# Patient Record
Sex: FEMALE | Race: WHITE | Employment: OTHER | URBAN - METROPOLITAN AREA
[De-identification: names, ages, dates, MRNs, and addresses within clinical notes are randomized per-mention and may not be internally consistent; named-entity substitution may affect disease eponyms.]

---

## 2019-03-10 ENCOUNTER — HOSPITAL ENCOUNTER (EMERGENCY)
Age: 84
Discharge: HOME | End: 2019-03-10
Payer: MEDICARE

## 2019-03-10 VITALS
HEART RATE: 74 BPM | SYSTOLIC BLOOD PRESSURE: 125 MMHG | DIASTOLIC BLOOD PRESSURE: 69 MMHG | TEMPERATURE: 99.1 F | RESPIRATION RATE: 20 BRPM | OXYGEN SATURATION: 96 %

## 2019-03-10 DIAGNOSIS — J20.9 ACUTE BRONCHITIS, UNSPECIFIED ORGANISM: Primary | ICD-10-CM

## 2019-03-10 PROCEDURE — 99212 OFFICE O/P EST SF 10 MIN: CPT

## 2019-03-10 RX ORDER — BENZONATATE 200 MG/1
200 CAPSULE ORAL 3 TIMES DAILY PRN
Qty: 21 CAPSULE | Refills: 0 | Status: SHIPPED | OUTPATIENT
Start: 2019-03-10 | End: 2021-06-10

## 2019-03-10 RX ORDER — DOXYCYCLINE 100 MG/1
100 CAPSULE ORAL 2 TIMES DAILY
Qty: 14 CAPSULE | Refills: 0 | Status: SHIPPED | OUTPATIENT
Start: 2019-03-10 | End: 2019-03-17

## 2019-12-03 ENCOUNTER — HOSPITAL ENCOUNTER (EMERGENCY)
Age: 84
Discharge: HOME | End: 2019-12-03
Payer: MEDICARE

## 2019-12-03 ENCOUNTER — APPOINTMENT (OUTPATIENT)
Dept: GENERAL RADIOLOGY | Age: 84
End: 2019-12-03
Payer: MEDICARE

## 2019-12-03 VITALS
OXYGEN SATURATION: 97 % | SYSTOLIC BLOOD PRESSURE: 150 MMHG | RESPIRATION RATE: 18 BRPM | TEMPERATURE: 97.5 F | HEART RATE: 53 BPM | DIASTOLIC BLOOD PRESSURE: 66 MMHG | BODY MASS INDEX: 33.07 KG/M2 | WEIGHT: 175 LBS

## 2019-12-03 DIAGNOSIS — S83.91XA SPRAIN OF RIGHT KNEE, UNSPECIFIED LIGAMENT, INITIAL ENCOUNTER: Primary | ICD-10-CM

## 2019-12-03 PROCEDURE — 99212 OFFICE O/P EST SF 10 MIN: CPT

## 2019-12-03 PROCEDURE — 73562 X-RAY EXAM OF KNEE 3: CPT

## 2019-12-03 ASSESSMENT — PAIN SCALES - GENERAL: PAINLEVEL_OUTOF10: 6

## 2019-12-03 ASSESSMENT — PAIN DESCRIPTION - LOCATION: LOCATION: KNEE

## 2019-12-03 ASSESSMENT — PAIN DESCRIPTION - PAIN TYPE: TYPE: ACUTE PAIN

## 2019-12-03 ASSESSMENT — PAIN DESCRIPTION - DESCRIPTORS: DESCRIPTORS: THROBBING

## 2019-12-03 ASSESSMENT — PAIN DESCRIPTION - ORIENTATION: ORIENTATION: RIGHT

## 2020-01-10 ENCOUNTER — OFFICE VISIT (OUTPATIENT)
Dept: ORTHOPEDIC SURGERY | Age: 85
End: 2020-01-10
Payer: MEDICARE

## 2020-01-10 VITALS — WEIGHT: 175.04 LBS | HEIGHT: 61 IN | BODY MASS INDEX: 33.05 KG/M2

## 2020-01-10 PROCEDURE — 1090F PRES/ABSN URINE INCON ASSESS: CPT | Performed by: ORTHOPAEDIC SURGERY

## 2020-01-10 PROCEDURE — 1036F TOBACCO NON-USER: CPT | Performed by: ORTHOPAEDIC SURGERY

## 2020-01-10 PROCEDURE — 4040F PNEUMOC VAC/ADMIN/RCVD: CPT | Performed by: ORTHOPAEDIC SURGERY

## 2020-01-10 PROCEDURE — G8484 FLU IMMUNIZE NO ADMIN: HCPCS | Performed by: ORTHOPAEDIC SURGERY

## 2020-01-10 PROCEDURE — G8400 PT W/DXA NO RESULTS DOC: HCPCS | Performed by: ORTHOPAEDIC SURGERY

## 2020-01-10 PROCEDURE — G8427 DOCREV CUR MEDS BY ELIG CLIN: HCPCS | Performed by: ORTHOPAEDIC SURGERY

## 2020-01-10 PROCEDURE — G8417 CALC BMI ABV UP PARAM F/U: HCPCS | Performed by: ORTHOPAEDIC SURGERY

## 2020-01-10 PROCEDURE — 1123F ACP DISCUSS/DSCN MKR DOCD: CPT | Performed by: ORTHOPAEDIC SURGERY

## 2020-01-10 PROCEDURE — 99213 OFFICE O/P EST LOW 20 MIN: CPT | Performed by: ORTHOPAEDIC SURGERY

## 2020-05-21 ENCOUNTER — APPOINTMENT (OUTPATIENT)
Dept: GENERAL RADIOLOGY | Age: 85
End: 2020-05-21
Payer: MEDICARE

## 2020-05-21 ENCOUNTER — HOSPITAL ENCOUNTER (EMERGENCY)
Age: 85
Discharge: HOME | End: 2020-05-21
Payer: MEDICARE

## 2020-05-21 VITALS
DIASTOLIC BLOOD PRESSURE: 75 MMHG | TEMPERATURE: 98.3 F | SYSTOLIC BLOOD PRESSURE: 122 MMHG | WEIGHT: 171 LBS | RESPIRATION RATE: 20 BRPM | BODY MASS INDEX: 32.31 KG/M2 | OXYGEN SATURATION: 95 % | HEART RATE: 60 BPM

## 2020-05-21 PROCEDURE — 73090 X-RAY EXAM OF FOREARM: CPT

## 2020-05-21 PROCEDURE — L3931 WHFO NONTORSION JOINT PREFAB: HCPCS

## 2020-05-21 PROCEDURE — 73502 X-RAY EXAM HIP UNI 2-3 VIEWS: CPT

## 2020-05-21 PROCEDURE — 99212 OFFICE O/P EST SF 10 MIN: CPT

## 2020-05-21 PROCEDURE — 73060 X-RAY EXAM OF HUMERUS: CPT

## 2020-05-21 ASSESSMENT — PAIN DESCRIPTION - ORIENTATION: ORIENTATION: LEFT

## 2020-05-21 ASSESSMENT — PAIN SCALES - GENERAL: PAINLEVEL_OUTOF10: 5

## 2020-05-21 ASSESSMENT — PAIN DESCRIPTION - LOCATION: LOCATION: HIP;WRIST

## 2020-05-21 ASSESSMENT — PAIN DESCRIPTION - PAIN TYPE: TYPE: ACUTE PAIN

## 2020-05-21 NOTE — ED PROVIDER NOTES
This is an 82-year-old female that presents to urgent care with friend. She states last night that she tripped over a cord and landed on her left hip and also her left arm. She also states that she felt a burning sensation to the top of her head but did not lose consciousness she states no headache today. No neck pain. No weakness or numbness. No chest pain no abdominal pain no back pain. No numbness or tingling. No weakness. On first contact patient she appears to be no acute distress. Review of Systems   Constitutional:        Pertinent positives and negatives are stated within HPI, all other systems reviewed and are negative. Physical Exam  Vitals signs and nursing note reviewed. Constitutional:       Appearance: She is well-developed. HENT:      Head: Normocephalic and atraumatic. Right Ear: Hearing and external ear normal.      Left Ear: Hearing and external ear normal.      Nose: Nose normal.      Mouth/Throat:      Pharynx: Uvula midline. Eyes:      General: Lids are normal.      Conjunctiva/sclera: Conjunctivae normal.      Pupils: Pupils are equal, round, and reactive to light. Neck:      Musculoskeletal: Normal range of motion and neck supple. Cardiovascular:      Rate and Rhythm: Normal rate and regular rhythm. Heart sounds: Normal heart sounds. No murmur. Pulmonary:      Effort: Pulmonary effort is normal.      Breath sounds: Normal breath sounds. Abdominal:      General: Bowel sounds are normal.      Palpations: Abdomen is soft. Abdomen is not rigid. Tenderness: There is no abdominal tenderness. There is no guarding or rebound. Musculoskeletal:      Comments: Head appears atraumatic. She does have no neck pain on exam.  No midline back pain no chest or abdominal pain. She does complain of some tenderness to the left lateral hip area. Some arm tenderness especially in the left bicep tricep area as well as left wrist.  No open areas. No cyanosis. No deformity noted. No other leg pain. Skin:     General: Skin is warm and dry. Findings: No abrasion or rash. Neurological:      Mental Status: She is alert and oriented to person, place, and time. GCS: GCS eye subscore is 4. GCS verbal subscore is 5. GCS motor subscore is 6. Cranial Nerves: No cranial nerve deficit. Sensory: No sensory deficit. Coordination: Coordination normal.      Gait: Gait normal.         Procedures    MDM  Number of Diagnoses or Management Options  Hip strain, left, initial encounter:   Muscle strain of left upper arm, initial encounter:   Sprain of left wrist, initial encounter:   Diagnosis management comments: xrays reviewed  Gait steady  No new compalints  Wrist splint  Instructions given.          --------------------------------------------- PAST HISTORY ---------------------------------------------  Past Medical History:  has a past medical history of Anxiety, Anxiety, Arthritis, Bronchitis, chronic (HCC), CAD (coronary artery disease), Dry eye syndrome, GERD (gastroesophageal reflux disease), Hiatal hernia, Hyperlipidemia, Hypertension, Macular degeneration, and Reflux. Past Surgical History:  has a past surgical history that includes Dilation and curettage of uterus; Endoscopy, colon, diagnostic; Cardiac surgery; and Colonoscopy (6/3/16). Social History:  reports that she has never smoked. She has never used smokeless tobacco. She reports that she does not drink alcohol or use drugs. Family History: family history includes Other in her father; Stroke in her mother. The patients home medications have been reviewed. Allergies: Alendronate sodium; Celebrex [celecoxib]; Ciloxan [ciprofloxacin]; Polymyxin b-hc [otobiotic]; Statins [statins]; Sulfa antibiotics; Codeine; Fluconazole;  Levofloxacin; and Vicodin [hydrocodone-acetaminophen]    -------------------------------------------------- RESULTS -------------------------------------------------  No results found for this visit on 05/21/20. XR HIP LEFT (2-3 VIEWS)   Final Result   1. Diffuse osteopenia but no acute traumatic abnormality involving the left   hip. XR HUMERUS LEFT (MIN 2 VIEWS)   Final Result   No acute osseous abnormality. XR RADIUS ULNA LEFT (2 VIEWS)   Final Result   No acute finding of the left forearm. ------------------------- NURSING NOTES AND VITALS REVIEWED ---------------------------   The nursing notes within the ED encounter and vital signs as below have been reviewed. /75   Pulse 60   Temp 98.3 °F (36.8 °C) (Oral)   Resp 20   Wt 171 lb (77.6 kg)   SpO2 95%   BMI 32.31 kg/m²   Oxygen Saturation Interpretation: Normal      ------------------------------------------ PROGRESS NOTES ------------------------------------------   I have spoken with the patient and discussed todays results, in addition to providing specific details for the plan of care and counseling regarding the diagnosis and prognosis. Their questions are answered at this time and they are agreeable with the plan.      --------------------------------- ADDITIONAL PROVIDER NOTES ---------------------------------     This patient is stable for discharge. I have shared the specific conditions for return, as well as the importance of follow-up. * NOTE: This report was transcribed using voice recognition software. Every effort was made to ensure accuracy; however, inadvertent computerized transcription errors may be present.    --------------------------------- IMPRESSION AND DISPOSITION ---------------------------------    IMPRESSION  1. Sprain of left wrist, initial encounter    2. Muscle strain of left upper arm, initial encounter    3.  Hip strain, left, initial encounter        DISPOSITION  Disposition: Discharge to home  Patient condition is good          Amira Oscar PA-C  05/21/20 6123

## 2020-12-09 ENCOUNTER — OFFICE VISIT (OUTPATIENT)
Dept: ORTHOPEDIC SURGERY | Age: 85
End: 2020-12-09
Payer: MEDICARE

## 2020-12-09 VITALS — TEMPERATURE: 98 F | WEIGHT: 168 LBS | HEIGHT: 61 IN | BODY MASS INDEX: 31.72 KG/M2

## 2020-12-09 PROCEDURE — 99213 OFFICE O/P EST LOW 20 MIN: CPT | Performed by: ORTHOPAEDIC SURGERY

## 2020-12-09 PROCEDURE — G8417 CALC BMI ABV UP PARAM F/U: HCPCS | Performed by: ORTHOPAEDIC SURGERY

## 2020-12-09 PROCEDURE — 1090F PRES/ABSN URINE INCON ASSESS: CPT | Performed by: ORTHOPAEDIC SURGERY

## 2020-12-09 PROCEDURE — G8427 DOCREV CUR MEDS BY ELIG CLIN: HCPCS | Performed by: ORTHOPAEDIC SURGERY

## 2020-12-09 PROCEDURE — G8484 FLU IMMUNIZE NO ADMIN: HCPCS | Performed by: ORTHOPAEDIC SURGERY

## 2020-12-09 PROCEDURE — G8400 PT W/DXA NO RESULTS DOC: HCPCS | Performed by: ORTHOPAEDIC SURGERY

## 2020-12-09 PROCEDURE — 4040F PNEUMOC VAC/ADMIN/RCVD: CPT | Performed by: ORTHOPAEDIC SURGERY

## 2020-12-09 PROCEDURE — 1036F TOBACCO NON-USER: CPT | Performed by: ORTHOPAEDIC SURGERY

## 2020-12-09 PROCEDURE — 1123F ACP DISCUSS/DSCN MKR DOCD: CPT | Performed by: ORTHOPAEDIC SURGERY

## 2020-12-09 NOTE — PROGRESS NOTES
Chief Complaint:   Chief Complaint   Patient presents with    Knee Pain     Left Knee, had a fall 2 weeks ago, states of swelling. Shalini Kearney presents with left knee pain. Recently she fell at [employer] missing a step because of the pattern on the carpet. She scraped her left knee also had bruising on the left hip. She is concerned about her knee and any significant injury to that. The skin wound is healing up nicely now. She is worried about infection previously but has been treating that to good effect. Allergies; medications; past medical, surgical, family, and social history; and problem list have been reviewed today and updated as indicated in this encounter seen below. Exam: The skin is healing from a longitudinal avulsion/laceration over patella. There is no drainage no breakdown and it should continue to heal very nicely. There are hip range of motion is good. Ankle and foot range of motion are good with good stability and no pain. There is mild tenderness around the left knee. There is minimal if any effusion. She has a range of motion of 0 to 110 degrees or more flexion. The patella itself is stable in its tracking and also in its inherent integrity. There is some crepitus with range of motion. She has stable collateral cruciate ligaments. There are no signs of meniscal pathology at this time. Radiographs: Imaging of the left knee and 2 views with weightbearing films showed no significant chondral narrowing or hypertrophic changes. The patella appears to be intact. There is a lucency traversing the patella but it goes beyond and it looks to be consistent with a skinfold. ASSESSMENT:    Kalyani Nguyen was seen today for knee pain. Diagnoses and all orders for this visit:    Acute pain of left knee    Abrasion, knee, left, initial encounter        PLAN: We discussed the injury which is primarily the contusion and the skin avulsion.   The skin is healing nicely and just needs  Hyperlipidemia     Hypertension     Macular degeneration     lily    Reflux        Past Surgical History:   Procedure Laterality Date    CARDIAC SURGERY      heart cath 11/29/10 with stent placed    COLONOSCOPY  6/3/16    DILATION AND CURETTAGE OF UTERUS      ENDOSCOPY, COLON, DIAGNOSTIC         Allergies   Allergen Reactions    Alendronate Sodium Other (See Comments)     Chest pains    Celebrex [Celecoxib] Other (See Comments)     Coffee ground emesis    Ciloxan [Ciprofloxacin] Itching     Eye drops allergy   Not oral     Polymyxin B-Hc [Otobiotic] Itching     Eye ointment    Statins [Statins] Other (See Comments)     Muscle weakness    Sulfa Antibiotics Other (See Comments)     unknown    Codeine Nausea And Vomiting    Fluconazole Itching, Nausea And Vomiting and Rash     Chest pains    Levofloxacin Nausea And Vomiting    Vicodin [Hydrocodone-Acetaminophen] Nausea And Vomiting       Social History     Socioeconomic History    Marital status:       Spouse name: None    Number of children: None    Years of education: None    Highest education level: None   Occupational History    None   Social Needs    Financial resource strain: None    Food insecurity     Worry: None     Inability: None    Transportation needs     Medical: None     Non-medical: None   Tobacco Use    Smoking status: Never Smoker    Smokeless tobacco: Never Used   Substance and Sexual Activity    Alcohol use: No     Comment: rare    Drug use: No    Sexual activity: Never   Lifestyle    Physical activity     Days per week: None     Minutes per session: None    Stress: None   Relationships    Social connections     Talks on phone: None     Gets together: None     Attends Evangelical service: None     Active member of club or organization: None     Attends meetings of clubs or organizations: None     Relationship status: None    Intimate partner violence     Fear of current or ex partner: None     Emotionally abused: None     Physically abused: None     Forced sexual activity: None   Other Topics Concern    None   Social History Narrative    None       Review of Systems  As follows except as previously noted in HPI:  Constitutional: Negative for chills, diaphoresis, fatigue, fever and unexpected weight change. Respiratory: Negative for cough, shortness of breath and wheezing. Cardiovascular: Negative for chest pain and palpitations. Neurological: Negative for dizziness, syncope, cephalgia. GI / : negative  Musculoskeletal: see HPI       Objective:   Physical Exam   Constitutional: Oriented to person, place, and time. and appears well-developed and well-nourished. :   Head: Normocephalic and atraumatic. Eyes: EOM are normal.   Neck: Neck supple. Cardiovascular: Normal rate and regular rhythm. Pulmonary/Chest: Effort normal. No stridor. No respiratory distress, no wheezes. Abdominal:  No abnormal distension. Neurological: Alert and oriented to person, place, and time. Skin: Skin is warm and dry. Psychiatric: Normal mood and affect.  Behavior is normal. Thought content normal.    ALBERTA Salas DO    12/9/20  3:29 PM

## 2021-01-20 ENCOUNTER — IMMUNIZATION (OUTPATIENT)
Dept: PRIMARY CARE CLINIC | Age: 86
End: 2021-01-20
Payer: MEDICARE

## 2021-01-20 PROCEDURE — 0011A COVID-19, MODERNA VACCINE 100MCG/0.5ML DOSE: CPT | Performed by: NURSE PRACTITIONER

## 2021-01-20 PROCEDURE — 91301 COVID-19, MODERNA VACCINE 100MCG/0.5ML DOSE: CPT | Performed by: NURSE PRACTITIONER

## 2021-02-17 ENCOUNTER — IMMUNIZATION (OUTPATIENT)
Dept: PRIMARY CARE CLINIC | Age: 86
End: 2021-02-17
Payer: MEDICARE

## 2021-02-17 PROCEDURE — 91301 COVID-19, MODERNA VACCINE 100MCG/0.5ML DOSE: CPT | Performed by: NURSE PRACTITIONER

## 2021-02-17 PROCEDURE — 0012A COVID-19, MODERNA VACCINE 100MCG/0.5ML DOSE: CPT | Performed by: NURSE PRACTITIONER

## 2021-06-10 RX ORDER — TORSEMIDE 5 MG/1
5 TABLET ORAL DAILY PRN
COMMUNITY

## 2021-06-13 ENCOUNTER — ANESTHESIA EVENT (OUTPATIENT)
Dept: OPERATING ROOM | Age: 86
End: 2021-06-13
Payer: MEDICARE

## 2021-06-13 NOTE — ANESTHESIA PRE PROCEDURE
Department of Anesthesiology  Preprocedure Note       Name:  Lavelle Brand   Age:  82 y.o.  :  1935                                          MRN:  [id number]         Date:  2021      Surgeon: Sofie Antunez):  Kendra Mccoy DO    Procedure: Procedure(s):  CATARACT EXTRACTION WITH IOL IMPLANT-RIGHT EYE    Medications prior to admission:   Prior to Admission medications    Medication Sig Start Date End Date Taking? Authorizing Provider   torsemide (DEMADEX) 5 MG tablet Take 5 mg by mouth daily as needed   Yes Historical Provider, MD   ezetimibe (ZETIA) 10 MG tablet Take 10 mg by mouth nightly   Yes Historical Provider, MD   Coenzyme Q10 (COQ10) 200 MG CAPS Take by mouth Daily   Yes Historical Provider, MD   Psyllium (METAMUCIL PO) Take by mouth daily   Yes Historical Provider, MD   Multiple Vitamins-Minerals (EYE VITAMINS) CAPS Take 1 tablet by mouth 2 times daily. Yes Historical Provider, MD   potassium chloride (K-TABS) 10 MEQ tablet Take 10 mEq by mouth every other day. Yes Historical Provider, MD   omeprazole (PRILOSEC) 20 MG capsule Take 20 mg by mouth daily am   Yes Historical Provider, MD   atenolol (TENORMIN) 25 MG tablet Take 25 mg by mouth daily AM   Yes Historical Provider, MD   escitalopram (LEXAPRO) 10 MG tablet Take 10 mg by mouth nightly. Yes Historical Provider, MD   cycloSPORINE (RESTASIS) 0.05 % ophthalmic emulsion 1 drop 2 times daily. Yes Historical Provider, MD   aspirin 81 MG tablet Take 81 mg by mouth nightly Follow Dr. Shaina Mock anticoagulation orders   Yes Historical Provider, MD   Glucosamine-Chondroitin (GLUCOSAMINE CHONDR COMPLEX) 500-400 MG CAPS Take  by mouth 2 times daily. Yes Historical Provider, MD   OMEGA 3 340 MG CPDR Take 1 tablet by mouth 2 times daily. Each tab 300 mg   Yes Historical Provider, MD   Vitamin D (CHOLECALCIFEROL) 1000 UNITS CAPS capsule Take 1,000 Units by mouth daily.    Yes Historical Provider, MD       Current medications:    No current facility-administered medications for this encounter. Current Outpatient Medications   Medication Sig Dispense Refill    torsemide (DEMADEX) 5 MG tablet Take 5 mg by mouth daily as needed      ezetimibe (ZETIA) 10 MG tablet Take 10 mg by mouth nightly      Coenzyme Q10 (COQ10) 200 MG CAPS Take by mouth Daily      Psyllium (METAMUCIL PO) Take by mouth daily      Multiple Vitamins-Minerals (EYE VITAMINS) CAPS Take 1 tablet by mouth 2 times daily.  potassium chloride (K-TABS) 10 MEQ tablet Take 10 mEq by mouth every other day.  omeprazole (PRILOSEC) 20 MG capsule Take 20 mg by mouth daily am      atenolol (TENORMIN) 25 MG tablet Take 25 mg by mouth daily AM      escitalopram (LEXAPRO) 10 MG tablet Take 10 mg by mouth nightly.  cycloSPORINE (RESTASIS) 0.05 % ophthalmic emulsion 1 drop 2 times daily.  aspirin 81 MG tablet Take 81 mg by mouth nightly Follow Dr. Nehal Mondragon anticoagulation orders      Glucosamine-Chondroitin (GLUCOSAMINE CHONDR COMPLEX) 500-400 MG CAPS Take  by mouth 2 times daily.  OMEGA 3 340 MG CPDR Take 1 tablet by mouth 2 times daily. Each tab 300 mg      Vitamin D (CHOLECALCIFEROL) 1000 UNITS CAPS capsule Take 1,000 Units by mouth daily. Allergies: Allergies   Allergen Reactions    Alendronate Sodium Other (See Comments)     Chest pains    Celebrex [Celecoxib] Other (See Comments)     Coffee ground emesis    Ciloxan [Ciprofloxacin] Itching     Eye drops allergy   Not oral     Polymyxin B-Hc [Otobiotic] Itching     Eye ointment    Statins [Statins] Other (See Comments)     Muscle weakness    Sulfa Antibiotics Other (See Comments)     unknown    Codeine Nausea And Vomiting    Fluconazole Itching, Nausea And Vomiting and Rash     Chest pains    Levofloxacin Nausea And Vomiting    Vicodin [Hydrocodone-Acetaminophen] Nausea And Vomiting       Problem List:  There is no problem list on file for this patient.       Past Medical History:        Diagnosis Date    Anxiety     Anxiety     Arthritis     Bronchitis, chronic (HCC)     CAD (coronary artery disease)     Dry eye syndrome     GERD (gastroesophageal reflux disease)     Hiatal hernia     Hyperlipidemia     Hypertension     Macular degeneration     lily    Reflux     Thyroid disease     goiter\" watching it- Dr. Jesus Coulter"       Past Surgical History:        Procedure Laterality Date    CARDIAC SURGERY      heart cath 11/29/10 with stent placed    COLONOSCOPY  6/3/16    DILATION AND CURETTAGE OF UTERUS      ENDOSCOPY, COLON, DIAGNOSTIC      VASCULAR SURGERY  11/29/2010    1 - stent:  Dr. Shannon Wilks- 1/21       Social History:    Social History     Tobacco Use    Smoking status: Never Smoker    Smokeless tobacco: Never Used   Substance Use Topics    Alcohol use: No     Comment: rare                                Counseling given: Not Answered      Vital Signs (Current):   Vitals:    06/10/21 0952   Weight: 175 lb (79.4 kg)   Height: 5' 2\" (1.575 m)                                              BP Readings from Last 3 Encounters:   05/21/20 122/75   12/03/19 (!) 150/66   03/10/19 125/69       NPO Status:                                                                                 BMI:   Wt Readings from Last 3 Encounters:   12/09/20 168 lb (76.2 kg)   05/21/20 171 lb (77.6 kg)   01/10/20 175 lb 0.7 oz (79.4 kg)     Body mass index is 32.01 kg/m².     CBC:   Lab Results   Component Value Date    WBC 6.3 05/31/2016    RBC 4.66 05/31/2016    HGB 13.8 05/31/2016    HCT 41.0 05/31/2016    MCV 87.9 05/31/2016    RDW 13.0 05/31/2016     05/31/2016       CMP:   Lab Results   Component Value Date     05/31/2016    K 4.4 05/31/2016     05/31/2016    CO2 26 05/31/2016    BUN 16 05/31/2016    CREATININE 0.8 05/31/2016    GFRAA >60 05/31/2016    LABGLOM >60 05/31/2016    GLUCOSE 111 05/31/2016    PROT 6.5 05/31/2016    CALCIUM 9.0 05/31/2016    BILITOT 0.4 05/31/2016    ALKPHOS 62 05/31/2016 AST 20 05/31/2016    ALT 20 05/31/2016       POC Tests: No results for input(s): POCGLU, POCNA, POCK, POCCL, POCBUN, POCHEMO, POCHCT in the last 72 hours. Coags:   Lab Results   Component Value Date    PROTIME 10.8 05/31/2016    INR 1.0 05/31/2016    APTT 32.7 05/31/2016       HCG (If Applicable): No results found for: PREGTESTUR, PREGSERUM, HCG, HCGQUANT     ABGs: No results found for: PHART, PO2ART, OWX6XQD, IXC9GKI, BEART, A4VXTDXP     Type & Screen (If Applicable):  No results found for: LABABO, LABRH    Drug/Infectious Status (If Applicable):  No results found for: HIV, HEPCAB    COVID-19 Screening (If Applicable): No results found for: COVID19        Anesthesia Evaluation  Patient summary reviewed  Airway:         Dental:          Pulmonary:                             ROS comment: Ch Bronchitis. Cardiovascular:    (+) hypertension:, CAD:, hyperlipidemia      ECG reviewed               Beta Blocker:  Dose within 24 Hrs      ROS comment: EKG: Bradycardia 60. Neuro/Psych:   Negative Neuro/Psych ROS  (+) depression/anxiety             GI/Hepatic/Renal:   (+) hiatal hernia, GERD:,           Endo/Other:    (+) hypothyroidism: arthritis:., .                  ROS comment: Dry Eye Syndrome. Macular Degeneration. Abdominal:           Vascular: negative vascular ROS. Anesthesia Plan      MAC     ASA 3       Induction: intravenous.                           Hurshel Collet, MD   6/13/2021

## 2021-06-15 ENCOUNTER — PREP FOR PROCEDURE (OUTPATIENT)
Dept: OPHTHALMOLOGY | Age: 86
End: 2021-06-15

## 2021-06-15 RX ORDER — KETOROLAC TROMETHAMINE 5 MG/ML
1 SOLUTION OPHTHALMIC SEE ADMIN INSTRUCTIONS
Status: CANCELLED | OUTPATIENT
Start: 2021-06-15

## 2021-06-15 RX ORDER — SODIUM CHLORIDE 0.9 % (FLUSH) 0.9 %
10 SYRINGE (ML) INJECTION EVERY 12 HOURS SCHEDULED
Status: CANCELLED | OUTPATIENT
Start: 2021-06-15

## 2021-06-15 RX ORDER — PROPARACAINE HYDROCHLORIDE 5 MG/ML
1 SOLUTION/ DROPS OPHTHALMIC SEE ADMIN INSTRUCTIONS
Status: CANCELLED | OUTPATIENT
Start: 2021-06-15

## 2021-06-15 RX ORDER — TROPICAMIDE 10 MG/ML
1 SOLUTION/ DROPS OPHTHALMIC SEE ADMIN INSTRUCTIONS
Status: CANCELLED | OUTPATIENT
Start: 2021-06-15

## 2021-06-15 RX ORDER — PHENYLEPHRINE HYDROCHLORIDE 100 MG/ML
1 SOLUTION/ DROPS OPHTHALMIC SEE ADMIN INSTRUCTIONS
Status: CANCELLED | OUTPATIENT
Start: 2021-06-15

## 2021-06-15 RX ORDER — SODIUM CHLORIDE 0.9 % (FLUSH) 0.9 %
10 SYRINGE (ML) INJECTION PRN
Status: CANCELLED | OUTPATIENT
Start: 2021-06-15

## 2021-06-15 RX ORDER — SODIUM CHLORIDE 9 MG/ML
25 INJECTION, SOLUTION INTRAVENOUS PRN
Status: CANCELLED | OUTPATIENT
Start: 2021-06-15

## 2021-06-16 ENCOUNTER — ANESTHESIA (OUTPATIENT)
Dept: OPERATING ROOM | Age: 86
End: 2021-06-16
Payer: MEDICARE

## 2021-06-16 ENCOUNTER — HOSPITAL ENCOUNTER (OUTPATIENT)
Age: 86
Setting detail: OUTPATIENT SURGERY
Discharge: HOME | End: 2021-06-16
Attending: OPHTHALMOLOGY | Admitting: OPHTHALMOLOGY
Payer: MEDICARE

## 2021-06-16 VITALS
DIASTOLIC BLOOD PRESSURE: 90 MMHG | SYSTOLIC BLOOD PRESSURE: 144 MMHG | RESPIRATION RATE: 16 BRPM | OXYGEN SATURATION: 94 %

## 2021-06-16 VITALS
HEIGHT: 62 IN | BODY MASS INDEX: 32.76 KG/M2 | TEMPERATURE: 98.6 F | OXYGEN SATURATION: 98 % | DIASTOLIC BLOOD PRESSURE: 65 MMHG | HEART RATE: 76 BPM | RESPIRATION RATE: 14 BRPM | SYSTOLIC BLOOD PRESSURE: 175 MMHG | WEIGHT: 178 LBS

## 2021-06-16 PROCEDURE — 3600000003 HC SURGERY LEVEL 3 BASE: Performed by: OPHTHALMOLOGY

## 2021-06-16 PROCEDURE — 3700000000 HC ANESTHESIA ATTENDED CARE: Performed by: OPHTHALMOLOGY

## 2021-06-16 PROCEDURE — 3700000001 HC ADD 15 MINUTES (ANESTHESIA): Performed by: OPHTHALMOLOGY

## 2021-06-16 PROCEDURE — 7100000010 HC PHASE II RECOVERY - FIRST 15 MIN: Performed by: OPHTHALMOLOGY

## 2021-06-16 PROCEDURE — 2720000010 HC SURG SUPPLY STERILE: Performed by: OPHTHALMOLOGY

## 2021-06-16 PROCEDURE — 7100000011 HC PHASE II RECOVERY - ADDTL 15 MIN: Performed by: OPHTHALMOLOGY

## 2021-06-16 PROCEDURE — 2500000003 HC RX 250 WO HCPCS: Performed by: OPHTHALMOLOGY

## 2021-06-16 PROCEDURE — 6370000000 HC RX 637 (ALT 250 FOR IP): Performed by: OPHTHALMOLOGY

## 2021-06-16 PROCEDURE — 2709999900 HC NON-CHARGEABLE SUPPLY: Performed by: OPHTHALMOLOGY

## 2021-06-16 PROCEDURE — 2580000003 HC RX 258: Performed by: ANESTHESIOLOGY

## 2021-06-16 PROCEDURE — 6360000002 HC RX W HCPCS: Performed by: NURSE ANESTHETIST, CERTIFIED REGISTERED

## 2021-06-16 PROCEDURE — V2632 POST CHMBR INTRAOCULAR LENS: HCPCS | Performed by: OPHTHALMOLOGY

## 2021-06-16 PROCEDURE — 3600000013 HC SURGERY LEVEL 3 ADDTL 15MIN: Performed by: OPHTHALMOLOGY

## 2021-06-16 PROCEDURE — 6360000002 HC RX W HCPCS: Performed by: OPHTHALMOLOGY

## 2021-06-16 DEVICE — LENS IOL POST 1-PC 6X13 20.5: Type: IMPLANTABLE DEVICE | Site: EYE | Status: FUNCTIONAL

## 2021-06-16 RX ORDER — BALANCED SALT SOLUTION 6.4; .75; .48; .3; 3.9; 1.7 MG/ML; MG/ML; MG/ML; MG/ML; MG/ML; MG/ML
SOLUTION OPHTHALMIC PRN
Status: DISCONTINUED | OUTPATIENT
Start: 2021-06-16 | End: 2021-06-16 | Stop reason: ALTCHOICE

## 2021-06-16 RX ORDER — TROPICAMIDE 10 MG/ML
1 SOLUTION/ DROPS OPHTHALMIC SEE ADMIN INSTRUCTIONS
Status: DISCONTINUED | OUTPATIENT
Start: 2021-06-16 | End: 2021-06-16 | Stop reason: HOSPADM

## 2021-06-16 RX ORDER — MOXIFLOXACIN 5 MG/ML
SOLUTION/ DROPS OPHTHALMIC PRN
Status: DISCONTINUED | OUTPATIENT
Start: 2021-06-16 | End: 2021-06-16 | Stop reason: ALTCHOICE

## 2021-06-16 RX ORDER — PHENYLEPHRINE HYDROCHLORIDE 100 MG/ML
1 SOLUTION/ DROPS OPHTHALMIC SEE ADMIN INSTRUCTIONS
Status: DISCONTINUED | OUTPATIENT
Start: 2021-06-16 | End: 2021-06-16 | Stop reason: HOSPADM

## 2021-06-16 RX ORDER — PROPARACAINE HYDROCHLORIDE 5 MG/ML
1 SOLUTION/ DROPS OPHTHALMIC SEE ADMIN INSTRUCTIONS
Status: DISCONTINUED | OUTPATIENT
Start: 2021-06-16 | End: 2021-06-16 | Stop reason: HOSPADM

## 2021-06-16 RX ORDER — TETRACAINE HYDROCHLORIDE 5 MG/ML
SOLUTION OPHTHALMIC PRN
Status: DISCONTINUED | OUTPATIENT
Start: 2021-06-16 | End: 2021-06-16 | Stop reason: ALTCHOICE

## 2021-06-16 RX ORDER — SODIUM CHLORIDE 0.9 % (FLUSH) 0.9 %
10 SYRINGE (ML) INJECTION PRN
Status: DISCONTINUED | OUTPATIENT
Start: 2021-06-16 | End: 2021-06-16 | Stop reason: HOSPADM

## 2021-06-16 RX ORDER — SODIUM CHLORIDE, SODIUM LACTATE, POTASSIUM CHLORIDE, CALCIUM CHLORIDE 600; 310; 30; 20 MG/100ML; MG/100ML; MG/100ML; MG/100ML
INJECTION, SOLUTION INTRAVENOUS CONTINUOUS
Status: DISCONTINUED | OUTPATIENT
Start: 2021-06-16 | End: 2021-06-16 | Stop reason: HOSPADM

## 2021-06-16 RX ORDER — MIDAZOLAM HYDROCHLORIDE 1 MG/ML
INJECTION INTRAMUSCULAR; INTRAVENOUS PRN
Status: DISCONTINUED | OUTPATIENT
Start: 2021-06-16 | End: 2021-06-16 | Stop reason: SDUPTHER

## 2021-06-16 RX ORDER — FENTANYL CITRATE 50 UG/ML
INJECTION, SOLUTION INTRAMUSCULAR; INTRAVENOUS PRN
Status: DISCONTINUED | OUTPATIENT
Start: 2021-06-16 | End: 2021-06-16 | Stop reason: SDUPTHER

## 2021-06-16 RX ORDER — SODIUM CHLORIDE 9 MG/ML
25 INJECTION, SOLUTION INTRAVENOUS PRN
Status: DISCONTINUED | OUTPATIENT
Start: 2021-06-16 | End: 2021-06-16 | Stop reason: HOSPADM

## 2021-06-16 RX ORDER — TRIAMCINOLONE ACETONIDE 40 MG/ML
INJECTION, SUSPENSION INTRA-ARTICULAR; INTRAMUSCULAR PRN
Status: DISCONTINUED | OUTPATIENT
Start: 2021-06-16 | End: 2021-06-16 | Stop reason: ALTCHOICE

## 2021-06-16 RX ORDER — KETOROLAC TROMETHAMINE 5 MG/ML
1 SOLUTION OPHTHALMIC SEE ADMIN INSTRUCTIONS
Status: DISCONTINUED | OUTPATIENT
Start: 2021-06-16 | End: 2021-06-16 | Stop reason: HOSPADM

## 2021-06-16 RX ORDER — SODIUM CHLORIDE 0.9 % (FLUSH) 0.9 %
10 SYRINGE (ML) INJECTION EVERY 12 HOURS SCHEDULED
Status: DISCONTINUED | OUTPATIENT
Start: 2021-06-16 | End: 2021-06-16 | Stop reason: HOSPADM

## 2021-06-16 RX ADMIN — FENTANYL CITRATE 50 MCG: 50 INJECTION, SOLUTION INTRAMUSCULAR; INTRAVENOUS at 07:42

## 2021-06-16 RX ADMIN — PROPARACAINE HYDROCHLORIDE 1 DROP: 5 SOLUTION/ DROPS OPHTHALMIC at 07:01

## 2021-06-16 RX ADMIN — FENTANYL CITRATE 50 MCG: 50 INJECTION, SOLUTION INTRAMUSCULAR; INTRAVENOUS at 07:46

## 2021-06-16 RX ADMIN — PHENYLEPHRINE HYDROCHLORIDE 1 DROP: 100 SOLUTION/ DROPS OPHTHALMIC at 07:01

## 2021-06-16 RX ADMIN — MIDAZOLAM 2 MG: 1 INJECTION INTRAMUSCULAR; INTRAVENOUS at 07:42

## 2021-06-16 RX ADMIN — TROPICAMIDE 1 DROP: 10 SOLUTION/ DROPS OPHTHALMIC at 07:01

## 2021-06-16 RX ADMIN — KETOROLAC TROMETHAMINE 1 DROP: 5 SOLUTION/ DROPS OPHTHALMIC at 07:01

## 2021-06-16 RX ADMIN — SODIUM CHLORIDE, POTASSIUM CHLORIDE, SODIUM LACTATE AND CALCIUM CHLORIDE: 600; 310; 30; 20 INJECTION, SOLUTION INTRAVENOUS at 06:51

## 2021-06-16 ASSESSMENT — PAIN - FUNCTIONAL ASSESSMENT: PAIN_FUNCTIONAL_ASSESSMENT: 0-10

## 2021-06-16 ASSESSMENT — PAIN SCALES - GENERAL
PAINLEVEL_OUTOF10: 0

## 2021-06-16 NOTE — OP NOTE
remove the cortical material remaining in the capsule. Once this was completed, the capsule was refilled with viscoelastic material.  The preselected intraocular lens was then placed in a lens injecting cartridge and passed through the corneal valve incision into the anterior chamber and thence into the capsular bag. It was then injected into the capsular bag and rotated into position with a Kuglen hook. It centered nicely, without difficulty. Once this is completed, the viscoelastic material was removed with the I/A device. The anterior chamber was reconstituted with BSS solution. The corneal valve was hydrated and the wound was checked for leaks. None were found, so it was elected to leave the wound sutureless. 0.1 cc of Vigamox was injected intracamerally and 4 mg of Kenalog was injected sub tenons. The drapes were removed and 2 drops of Vigamox solution were dropped into the eye. A clear plastic shield was placed over the eye and the patient was removed to the recovery area, having tolerated the procedure well with no complications.       Electronically signed by Jeison Salazar DO on 6/16/2021 at 7:59 AM  .

## 2021-06-16 NOTE — ANESTHESIA POSTPROCEDURE EVALUATION
Department of Anesthesiology  Postprocedure Note    Patient: Trixie Clark  MRN: [id number]  YOB: 1935  Date of evaluation: 6/16/2021  Time:  8:42 AM     Procedure Summary     Date: 06/16/21 Room / Location: [address] [place] [address]    Anesthesia Start: 3357 Anesthesia Stop: 0804    Procedure: CATARACT EXTRACTION WITH IOL IMPLANT-RIGHT EYE (Right ) Diagnosis: (NUCLEAR SCLEROTIC CATARACT)    Surgeons: Kyle Galvez DO Responsible Provider: Caprice Arteaga MD    Anesthesia Type: MAC ASA Status: 3          Anesthesia Type: MAC    Francis Phase I: Francis Score: 10    Francis Phase II: Francis Score: 10    Last vitals: Reviewed and per EMR flowsheets.        Anesthesia Post Evaluation    Patient location during evaluation: PACU  Patient participation: complete - patient participated  Level of consciousness: awake  Pain score: 0  Airway patency: patent  Nausea & Vomiting: no nausea  Complications: no  Cardiovascular status: blood pressure returned to baseline  Respiratory status: acceptable  Hydration status: euvolemic

## 2021-06-16 NOTE — ANESTHESIA PRE PROCEDURE
Department of Anesthesiology  Preprocedure Note       Name:  Suzan Aceves   Age:  82 y.o.  :  1935                                          MRN:  [id number]         Date:  2021      Surgeon: Nicola Cruz):  Juliano Stoll DO    Procedure: Procedure(s):  CATARACT EXTRACTION WITH IOL IMPLANT-RIGHT EYE    Medications prior to admission:   Prior to Admission medications    Medication Sig Start Date End Date Taking? Authorizing Provider   torsemide (DEMADEX) 5 MG tablet Take 5 mg by mouth daily as needed    Historical Provider, MD   ezetimibe (ZETIA) 10 MG tablet Take 10 mg by mouth nightly    Historical Provider, MD   Coenzyme Q10 (COQ10) 200 MG CAPS Take by mouth Daily    Historical Provider, MD   Psyllium (METAMUCIL PO) Take by mouth daily    Historical Provider, MD   Multiple Vitamins-Minerals (EYE VITAMINS) CAPS Take 1 tablet by mouth 2 times daily. Historical Provider, MD   potassium chloride (K-TABS) 10 MEQ tablet Take 10 mEq by mouth every other day. Historical Provider, MD   omeprazole (PRILOSEC) 20 MG capsule Take 20 mg by mouth daily am    Historical Provider, MD   atenolol (TENORMIN) 25 MG tablet Take 25 mg by mouth daily AM    Historical Provider, MD   escitalopram (LEXAPRO) 10 MG tablet Take 10 mg by mouth nightly. Historical Provider, MD   cycloSPORINE (RESTASIS) 0.05 % ophthalmic emulsion 1 drop 2 times daily. Historical Provider, MD   aspirin 81 MG tablet Take 81 mg by mouth nightly Follow Dr. Yarely Rose anticoagulation orders    Historical Provider, MD   Glucosamine-Chondroitin (GLUCOSAMINE CHONDR COMPLEX) 500-400 MG CAPS Take  by mouth 2 times daily. Historical Provider, MD   OMEGA 3 340 MG CPDR Take 1 tablet by mouth 2 times daily. Each tab 300 mg    Historical Provider, MD   Vitamin D (CHOLECALCIFEROL) 1000 UNITS CAPS capsule Take 1,000 Units by mouth daily.     Historical Provider, MD       Current medications:    No current facility-administered medications for this visit. No current outpatient medications on file. Facility-Administered Medications Ordered in Other Visits   Medication Dose Route Frequency Provider Last Rate Last Admin    lactated ringers infusion   Intravenous Continuous Anju Flores  mL/hr at 06/16/21 0651 New Bag at 06/16/21 0651    0.9 % sodium chloride infusion  25 mL Intravenous PRN Peola Blare, DO        ketorolac (ACULAR) 0.5 % ophthalmic solution 1 drop  1 drop Right Eye See Admin Instructions Peola Blare, DO   1 drop at 06/16/21 0701    phenylephrine (CAMILA-SYNEPHRINE) 10 % ophthalmic solution 1 drop  1 drop Right Eye See Admin Instructions Peola Blare, DO   1 drop at 06/16/21 0701    proparacaine (ALCAINE) 0.5 % ophthalmic solution 1 drop  1 drop Right Eye See Admin Instructions Peola Blare, DO   1 drop at 06/16/21 0701    sodium chloride flush 0.9 % injection 10 mL  10 mL Intravenous 2 times per day Peola Blare, DO        sodium chloride flush 0.9 % injection 10 mL  10 mL Intravenous PRN Peola Blare, DO        tropicamide (MYDRIACYL) 1 % ophthalmic solution 1 drop  1 drop Right Eye See Admin Instructions Peola Blare, DO   1 drop at 06/16/21 0701       Allergies: Allergies   Allergen Reactions    Alendronate Sodium Other (See Comments)     Chest pains    Celebrex [Celecoxib] Other (See Comments)     Coffee ground emesis    Ciloxan [Ciprofloxacin] Itching     Eye drops allergy   Not oral     Polymyxin B-Hc [Otobiotic] Itching     Eye ointment    Statins [Statins] Other (See Comments)     Muscle weakness    Sulfa Antibiotics Other (See Comments)     unknown    Codeine Nausea And Vomiting    Fluconazole Itching, Nausea And Vomiting and Rash     Chest pains    Levofloxacin Nausea And Vomiting    Vicodin [Hydrocodone-Acetaminophen] Nausea And Vomiting       Problem List:  There is no problem list on file for this patient.       Past Medical History: Diagnosis Date    Anxiety     Anxiety     Arthritis     Bronchitis, chronic (HCC)     CAD (coronary artery disease)     Dry eye syndrome     GERD (gastroesophageal reflux disease)     Hiatal hernia     Hyperlipidemia     Hypertension     Macular degeneration     lily    Reflux     Thyroid disease     goiter\" watching it- Dr. Aparna Holland"       Past Surgical History:        Procedure Laterality Date    CARDIAC SURGERY      heart cath 11/29/10 with stent placed    COLONOSCOPY  6/3/16    DILATION AND CURETTAGE OF UTERUS      ENDOSCOPY, COLON, DIAGNOSTIC      VASCULAR SURGERY  11/29/2010    1 - stent:  Dr. Maria Esther Tolbert- 1/21       Social History:    Social History     Tobacco Use    Smoking status: Never Smoker    Smokeless tobacco: Never Used   Substance Use Topics    Alcohol use: No     Comment: rare                                Counseling given: Not Answered      Vital Signs (Current): There were no vitals filed for this visit.                                            BP Readings from Last 3 Encounters:   06/16/21 (!) 186/86   05/21/20 122/75   12/03/19 (!) 150/66       NPO Status:                                                                                 BMI:   Wt Readings from Last 3 Encounters:   06/16/21 178 lb (80.7 kg)   12/09/20 168 lb (76.2 kg)   05/21/20 171 lb (77.6 kg)     There is no height or weight on file to calculate BMI.    CBC:   Lab Results   Component Value Date    WBC 6.3 05/31/2016    RBC 4.66 05/31/2016    HGB 13.8 05/31/2016    HCT 41.0 05/31/2016    MCV 87.9 05/31/2016    RDW 13.0 05/31/2016     05/31/2016       CMP:   Lab Results   Component Value Date     05/31/2016    K 4.4 05/31/2016     05/31/2016    CO2 26 05/31/2016    BUN 16 05/31/2016    CREATININE 0.8 05/31/2016    GFRAA >60 05/31/2016    LABGLOM >60 05/31/2016    GLUCOSE 111 05/31/2016    PROT 6.5 05/31/2016    CALCIUM 9.0 05/31/2016    BILITOT 0.4 05/31/2016    ALKPHOS 62 05/31/2016    AST 20 05/31/2016    ALT 20 05/31/2016       POC Tests: No results for input(s): POCGLU, POCNA, POCK, POCCL, POCBUN, POCHEMO, POCHCT in the last 72 hours. Coags:   Lab Results   Component Value Date    PROTIME 10.8 05/31/2016    INR 1.0 05/31/2016    APTT 32.7 05/31/2016       HCG (If Applicable): No results found for: PREGTESTUR, PREGSERUM, HCG, HCGQUANT     ABGs: No results found for: PHART, PO2ART, AAC1PGN, AVV2BPY, BEART, A5NUSWIT     Type & Screen (If Applicable):  No results found for: LABABO, LABRH    Drug/Infectious Status (If Applicable):  No results found for: HIV, HEPCAB    COVID-19 Screening (If Applicable): No results found for: COVID19        Anesthesia Evaluation  Patient summary reviewed  Airway: Mallampati: I  TM distance: >3 FB   Neck ROM: full  Mouth opening: > = 3 FB Dental:          Pulmonary:normal exam  breath sounds clear to auscultation                            ROS comment: Ch Bronchitis. Cardiovascular:    (+) hypertension:, CAD:, hyperlipidemia      ECG reviewed  Rhythm: regular  Rate: normal           Beta Blocker:  Dose within 24 Hrs      ROS comment: EKG: Bradycardia 60. Neuro/Psych:   Negative Neuro/Psych ROS  (+) depression/anxiety             GI/Hepatic/Renal:   (+) hiatal hernia, GERD:,           Endo/Other:    (+) hypothyroidism: arthritis:., .                  ROS comment: Dry Eye Syndrome. Macular Degeneration. Abdominal:           Vascular: negative vascular ROS. Anesthesia Plan      MAC     ASA 3       Induction: intravenous. Anesthetic plan and risks discussed with patient. Plan discussed with CRNA.     Attending anesthesiologist reviewed and agrees with Tom Tran MD   6/16/2021

## 2021-06-16 NOTE — H&P
Update History & Physical    The patient's History and Physical was reviewed with the patient and there were no significant changes. I examined the patient and there were no significant changes from the previous History and Physical.    Plan: The risk, benefits, expected outcome, and alternative to the recommended procedure have been discussed with the patient. Patient understands and wants to proceed with the procedure.     Electronically signed by Valentin Alfred DO on 6/16/21 at 7:59 AM EDT

## 2021-06-23 RX ORDER — PROPARACAINE HYDROCHLORIDE 5 MG/ML
1 SOLUTION/ DROPS OPHTHALMIC SEE ADMIN INSTRUCTIONS
Status: CANCELLED | OUTPATIENT
Start: 2021-06-23

## 2021-06-23 RX ORDER — SODIUM CHLORIDE 0.9 % (FLUSH) 0.9 %
10 SYRINGE (ML) INJECTION PRN
Status: CANCELLED | OUTPATIENT
Start: 2021-06-23

## 2021-06-23 RX ORDER — TROPICAMIDE 10 MG/ML
1 SOLUTION/ DROPS OPHTHALMIC SEE ADMIN INSTRUCTIONS
Status: CANCELLED | OUTPATIENT
Start: 2021-06-23

## 2021-06-23 RX ORDER — PHENYLEPHRINE HYDROCHLORIDE 100 MG/ML
1 SOLUTION/ DROPS OPHTHALMIC SEE ADMIN INSTRUCTIONS
Status: CANCELLED | OUTPATIENT
Start: 2021-06-23

## 2021-06-23 RX ORDER — SODIUM CHLORIDE 0.9 % (FLUSH) 0.9 %
10 SYRINGE (ML) INJECTION EVERY 12 HOURS SCHEDULED
Status: CANCELLED | OUTPATIENT
Start: 2021-06-23

## 2021-06-23 RX ORDER — SODIUM CHLORIDE 9 MG/ML
25 INJECTION, SOLUTION INTRAVENOUS PRN
Status: CANCELLED | OUTPATIENT
Start: 2021-06-23

## 2021-06-23 RX ORDER — KETOROLAC TROMETHAMINE 5 MG/ML
1 SOLUTION OPHTHALMIC SEE ADMIN INSTRUCTIONS
Status: CANCELLED | OUTPATIENT
Start: 2021-06-23

## 2021-06-25 ENCOUNTER — ANESTHESIA EVENT (OUTPATIENT)
Dept: OPERATING ROOM | Age: 86
End: 2021-06-25
Payer: MEDICARE

## 2021-06-25 NOTE — ANESTHESIA PRE PROCEDURE
Department of Anesthesiology  Preprocedure Note       Name:  Darren Kent   Age:  82 y.o.  :  1935                                          MRN:  [id number]         Date:  2021      Surgeon: Timoteo Meza):  Sally Deajorge,     Procedure: Procedure(s):  CATARACT EXTRACTION WITH IOL IMPLANT-LEFT EYE    Medications prior to admission:   Prior to Admission medications    Medication Sig Start Date End Date Taking? Authorizing Provider   torsemide (DEMADEX) 5 MG tablet Take 5 mg by mouth daily as needed    Historical Provider, MD   ezetimibe (ZETIA) 10 MG tablet Take 10 mg by mouth nightly    Historical Provider, MD   Coenzyme Q10 (COQ10) 200 MG CAPS Take by mouth Daily    Historical Provider, MD   Psyllium (METAMUCIL PO) Take by mouth daily    Historical Provider, MD   Multiple Vitamins-Minerals (EYE VITAMINS) CAPS Take 1 tablet by mouth 2 times daily. Historical Provider, MD   potassium chloride (K-TABS) 10 MEQ tablet Take 10 mEq by mouth every other day. Historical Provider, MD   omeprazole (PRILOSEC) 20 MG capsule Take 20 mg by mouth daily am    Historical Provider, MD   atenolol (TENORMIN) 25 MG tablet Take 25 mg by mouth daily AM    Historical Provider, MD   escitalopram (LEXAPRO) 10 MG tablet Take 10 mg by mouth nightly. Historical Provider, MD   cycloSPORINE (RESTASIS) 0.05 % ophthalmic emulsion 1 drop 2 times daily. Historical Provider, MD   aspirin 81 MG tablet Take 81 mg by mouth nightly Follow Dr. Poppy Tran anticoagulation orders    Historical Provider, MD   Glucosamine-Chondroitin (GLUCOSAMINE CHONDR COMPLEX) 500-400 MG CAPS Take  by mouth 2 times daily. Historical Provider, MD   OMEGA 3 340 MG CPDR Take 1 tablet by mouth 2 times daily. Each tab 300 mg    Historical Provider, MD   Vitamin D (CHOLECALCIFEROL) 1000 UNITS CAPS capsule Take 1,000 Units by mouth daily.     Historical Provider, MD       Current medications:    Current Outpatient Medications   Medication Sig Dispense Bronchitis, chronic (HCC)     CAD (coronary artery disease)     Dry eye syndrome     GERD (gastroesophageal reflux disease)     Hiatal hernia     Hyperlipidemia     Hypertension     Macular degeneration     lily    Reflux     Thyroid disease     goiter\" watching it- Dr. Tod Richard"       Past Surgical History:        Procedure Laterality Date    CARDIAC SURGERY      heart cath 11/29/10 with stent placed    COLONOSCOPY  6/3/16    DILATION AND CURETTAGE OF UTERUS      ENDOSCOPY, COLON, DIAGNOSTIC      INTRACAPSULAR CATARACT EXTRACTION Right 6/16/2021    CATARACT EXTRACTION WITH IOL IMPLANT-RIGHT EYE performed by Tere Yip DO at [address]  11/29/2010    1 - stent:  Dr. Ronald Potter- 1/21       Social History:    Social History     Tobacco Use    Smoking status: Never Smoker    Smokeless tobacco: Never Used   Substance Use Topics    Alcohol use: No     Comment: rare                                Counseling given: Not Answered      Vital Signs (Current): There were no vitals filed for this visit.                                            BP Readings from Last 3 Encounters:   06/16/21 (!) 144/90   06/16/21 (!) 175/65   05/21/20 122/75       NPO Status:  >8.H                                                                               BMI:   Wt Readings from Last 3 Encounters:   06/16/21 178 lb (80.7 kg)   12/09/20 168 lb (76.2 kg)   05/21/20 171 lb (77.6 kg)     There is no height or weight on file to calculate BMI.    CBC:   Lab Results   Component Value Date    WBC 6.3 05/31/2016    RBC 4.66 05/31/2016    HGB 13.8 05/31/2016    HCT 41.0 05/31/2016    MCV 87.9 05/31/2016    RDW 13.0 05/31/2016     05/31/2016       CMP:   Lab Results   Component Value Date     05/31/2016    K 4.4 05/31/2016     05/31/2016    CO2 26 05/31/2016    BUN 16 05/31/2016    CREATININE 0.8 05/31/2016    GFRAA >60 05/31/2016    LABGLOM >60 05/31/2016    GLUCOSE 111 05/31/2016 PROT 6.5 05/31/2016    CALCIUM 9.0 05/31/2016    BILITOT 0.4 05/31/2016    ALKPHOS 62 05/31/2016    AST 20 05/31/2016    ALT 20 05/31/2016       POC Tests: No results for input(s): POCGLU, POCNA, POCK, POCCL, POCBUN, POCHEMO, POCHCT in the last 72 hours. Coags:   Lab Results   Component Value Date    PROTIME 10.8 05/31/2016    INR 1.0 05/31/2016    APTT 32.7 05/31/2016       HCG (If Applicable): No results found for: PREGTESTUR, PREGSERUM, HCG, HCGQUANT     ABGs: No results found for: PHART, PO2ART, ELW4ZUT, ZCB8USS, BEART, Z6HODTPU     Type & Screen (If Applicable):  No results found for: LABABO, LABRH    Drug/Infectious Status (If Applicable):  No results found for: HIV, HEPCAB    COVID-19 Screening (If Applicable): No results found for: COVID19        Anesthesia Evaluation  Patient summary reviewed no history of anesthetic complications:   Airway: Mallampati: I  TM distance: >3 FB   Neck ROM: full  Mouth opening: > = 3 FB Dental:          Pulmonary:normal exam  breath sounds clear to auscultation      (-) not a current smoker                          ROS comment: Ch Bronchitis. Cardiovascular:  Exercise tolerance: good (>4 METS),   (+) hypertension:, CAD:, hyperlipidemia      ECG reviewed  Rhythm: regular  Rate: normal           Beta Blocker:  Dose within 24 Hrs      ROS comment: EKG: Bradycardia 60. Neuro/Psych:   Negative Neuro/Psych ROS  (+) depression/anxiety             GI/Hepatic/Renal:   (+) hiatal hernia, GERD:,           Endo/Other:    (+) hypothyroidism: arthritis:., .                  ROS comment: Dry Eye Syndrome. Macular Degeneration. Abdominal:   (+) obese,           Vascular: negative vascular ROS. Other Findings:             Anesthesia Plan      MAC     ASA 3       Induction: intravenous. Anesthetic plan and risks discussed with patient. Plan discussed with CRNA.                   Jesus Mckeon MD   6/25/2021

## 2021-06-30 ENCOUNTER — ANESTHESIA (OUTPATIENT)
Dept: OPERATING ROOM | Age: 86
End: 2021-06-30
Payer: MEDICARE

## 2021-06-30 ENCOUNTER — HOSPITAL ENCOUNTER (OUTPATIENT)
Age: 86
Setting detail: OUTPATIENT SURGERY
Discharge: HOME | End: 2021-06-30
Attending: OPHTHALMOLOGY | Admitting: OPHTHALMOLOGY
Payer: MEDICARE

## 2021-06-30 VITALS
DIASTOLIC BLOOD PRESSURE: 88 MMHG | RESPIRATION RATE: 19 BRPM | SYSTOLIC BLOOD PRESSURE: 192 MMHG | OXYGEN SATURATION: 94 %

## 2021-06-30 VITALS
BODY MASS INDEX: 32.76 KG/M2 | RESPIRATION RATE: 16 BRPM | OXYGEN SATURATION: 95 % | SYSTOLIC BLOOD PRESSURE: 166 MMHG | WEIGHT: 178 LBS | HEIGHT: 62 IN | TEMPERATURE: 97 F | HEART RATE: 56 BPM | DIASTOLIC BLOOD PRESSURE: 63 MMHG

## 2021-06-30 DIAGNOSIS — H59.022 CATARACT (LENS) FRAGMENTS IN EYE FOLLOWING CATARACT SURGERY, LEFT EYE: Primary | ICD-10-CM

## 2021-06-30 PROCEDURE — 6370000000 HC RX 637 (ALT 250 FOR IP): Performed by: OPHTHALMOLOGY

## 2021-06-30 PROCEDURE — 3700000001 HC ADD 15 MINUTES (ANESTHESIA): Performed by: OPHTHALMOLOGY

## 2021-06-30 PROCEDURE — 2709999900 HC NON-CHARGEABLE SUPPLY: Performed by: OPHTHALMOLOGY

## 2021-06-30 PROCEDURE — 6360000002 HC RX W HCPCS: Performed by: NURSE ANESTHETIST, CERTIFIED REGISTERED

## 2021-06-30 PROCEDURE — 6360000002 HC RX W HCPCS: Performed by: OPHTHALMOLOGY

## 2021-06-30 PROCEDURE — 7100000010 HC PHASE II RECOVERY - FIRST 15 MIN: Performed by: OPHTHALMOLOGY

## 2021-06-30 PROCEDURE — 2720000010 HC SURG SUPPLY STERILE: Performed by: OPHTHALMOLOGY

## 2021-06-30 PROCEDURE — 2580000003 HC RX 258: Performed by: ANESTHESIOLOGY

## 2021-06-30 PROCEDURE — 7100000011 HC PHASE II RECOVERY - ADDTL 15 MIN: Performed by: OPHTHALMOLOGY

## 2021-06-30 PROCEDURE — 3600000013 HC SURGERY LEVEL 3 ADDTL 15MIN: Performed by: OPHTHALMOLOGY

## 2021-06-30 PROCEDURE — V2632 POST CHMBR INTRAOCULAR LENS: HCPCS | Performed by: OPHTHALMOLOGY

## 2021-06-30 PROCEDURE — 6360000002 HC RX W HCPCS: Performed by: ANESTHESIOLOGY

## 2021-06-30 PROCEDURE — 2500000003 HC RX 250 WO HCPCS: Performed by: OPHTHALMOLOGY

## 2021-06-30 PROCEDURE — 3600000003 HC SURGERY LEVEL 3 BASE: Performed by: OPHTHALMOLOGY

## 2021-06-30 PROCEDURE — 3700000000 HC ANESTHESIA ATTENDED CARE: Performed by: OPHTHALMOLOGY

## 2021-06-30 DEVICE — LENS IO +200 DIOPT L13MM DIA6MM 0DEG HAPTIC ANG A CONSTANT: Type: IMPLANTABLE DEVICE | Site: EYE | Status: FUNCTIONAL

## 2021-06-30 RX ORDER — PHENYLEPHRINE HYDROCHLORIDE 100 MG/ML
1 SOLUTION/ DROPS OPHTHALMIC SEE ADMIN INSTRUCTIONS
Status: DISCONTINUED | OUTPATIENT
Start: 2021-06-30 | End: 2021-06-30 | Stop reason: HOSPADM

## 2021-06-30 RX ORDER — FENTANYL CITRATE 50 UG/ML
50 INJECTION, SOLUTION INTRAMUSCULAR; INTRAVENOUS EVERY 5 MIN PRN
Status: DISCONTINUED | OUTPATIENT
Start: 2021-06-30 | End: 2021-06-30 | Stop reason: HOSPADM

## 2021-06-30 RX ORDER — PROMETHAZINE HYDROCHLORIDE 25 MG/ML
25 INJECTION, SOLUTION INTRAMUSCULAR; INTRAVENOUS
Status: COMPLETED | OUTPATIENT
Start: 2021-06-30 | End: 2021-06-30

## 2021-06-30 RX ORDER — KETOROLAC TROMETHAMINE 5 MG/ML
1 SOLUTION OPHTHALMIC SEE ADMIN INSTRUCTIONS
Status: DISCONTINUED | OUTPATIENT
Start: 2021-06-30 | End: 2021-06-30 | Stop reason: HOSPADM

## 2021-06-30 RX ORDER — SODIUM CHLORIDE 9 MG/ML
25 INJECTION, SOLUTION INTRAVENOUS PRN
Status: DISCONTINUED | OUTPATIENT
Start: 2021-06-30 | End: 2021-06-30 | Stop reason: HOSPADM

## 2021-06-30 RX ORDER — MOXIFLOXACIN 5 MG/ML
SOLUTION/ DROPS OPHTHALMIC PRN
Status: DISCONTINUED | OUTPATIENT
Start: 2021-06-30 | End: 2021-06-30 | Stop reason: ALTCHOICE

## 2021-06-30 RX ORDER — MEPERIDINE HYDROCHLORIDE 25 MG/ML
12.5 INJECTION INTRAMUSCULAR; INTRAVENOUS; SUBCUTANEOUS EVERY 5 MIN PRN
Status: DISCONTINUED | OUTPATIENT
Start: 2021-06-30 | End: 2021-06-30 | Stop reason: HOSPADM

## 2021-06-30 RX ORDER — DIPHENHYDRAMINE HYDROCHLORIDE 50 MG/ML
12.5 INJECTION INTRAMUSCULAR; INTRAVENOUS
Status: DISCONTINUED | OUTPATIENT
Start: 2021-06-30 | End: 2021-06-30 | Stop reason: HOSPADM

## 2021-06-30 RX ORDER — SODIUM CHLORIDE, SODIUM LACTATE, POTASSIUM CHLORIDE, CALCIUM CHLORIDE 600; 310; 30; 20 MG/100ML; MG/100ML; MG/100ML; MG/100ML
INJECTION, SOLUTION INTRAVENOUS CONTINUOUS
Status: DISCONTINUED | OUTPATIENT
Start: 2021-06-30 | End: 2021-06-30 | Stop reason: HOSPADM

## 2021-06-30 RX ORDER — BALANCED SALT SOLUTION 6.4; .75; .48; .3; 3.9; 1.7 MG/ML; MG/ML; MG/ML; MG/ML; MG/ML; MG/ML
SOLUTION OPHTHALMIC PRN
Status: DISCONTINUED | OUTPATIENT
Start: 2021-06-30 | End: 2021-06-30 | Stop reason: ALTCHOICE

## 2021-06-30 RX ORDER — HYDRALAZINE HYDROCHLORIDE 20 MG/ML
5 INJECTION INTRAMUSCULAR; INTRAVENOUS EVERY 10 MIN PRN
Status: DISCONTINUED | OUTPATIENT
Start: 2021-06-30 | End: 2021-06-30 | Stop reason: HOSPADM

## 2021-06-30 RX ORDER — TRIAMCINOLONE ACETONIDE 40 MG/ML
INJECTION, SUSPENSION INTRA-ARTICULAR; INTRAMUSCULAR PRN
Status: DISCONTINUED | OUTPATIENT
Start: 2021-06-30 | End: 2021-06-30 | Stop reason: ALTCHOICE

## 2021-06-30 RX ORDER — MORPHINE SULFATE 2 MG/ML
1 INJECTION, SOLUTION INTRAMUSCULAR; INTRAVENOUS EVERY 5 MIN PRN
Status: DISCONTINUED | OUTPATIENT
Start: 2021-06-30 | End: 2021-06-30 | Stop reason: HOSPADM

## 2021-06-30 RX ORDER — TROPICAMIDE 10 MG/ML
1 SOLUTION/ DROPS OPHTHALMIC SEE ADMIN INSTRUCTIONS
Status: DISCONTINUED | OUTPATIENT
Start: 2021-06-30 | End: 2021-06-30 | Stop reason: HOSPADM

## 2021-06-30 RX ORDER — PROPARACAINE HYDROCHLORIDE 5 MG/ML
1 SOLUTION/ DROPS OPHTHALMIC SEE ADMIN INSTRUCTIONS
Status: DISCONTINUED | OUTPATIENT
Start: 2021-06-30 | End: 2021-06-30 | Stop reason: HOSPADM

## 2021-06-30 RX ORDER — ONDANSETRON 4 MG/1
4 TABLET, ORALLY DISINTEGRATING ORAL EVERY 12 HOURS PRN
Qty: 2 TABLET | Refills: 0 | Status: SHIPPED | OUTPATIENT
Start: 2021-06-30

## 2021-06-30 RX ORDER — TETRACAINE HYDROCHLORIDE 5 MG/ML
SOLUTION OPHTHALMIC PRN
Status: DISCONTINUED | OUTPATIENT
Start: 2021-06-30 | End: 2021-06-30 | Stop reason: ALTCHOICE

## 2021-06-30 RX ORDER — FENTANYL CITRATE 50 UG/ML
INJECTION, SOLUTION INTRAMUSCULAR; INTRAVENOUS PRN
Status: DISCONTINUED | OUTPATIENT
Start: 2021-06-30 | End: 2021-06-30 | Stop reason: SDUPTHER

## 2021-06-30 RX ORDER — LABETALOL HYDROCHLORIDE 5 MG/ML
5 INJECTION, SOLUTION INTRAVENOUS EVERY 10 MIN PRN
Status: DISCONTINUED | OUTPATIENT
Start: 2021-06-30 | End: 2021-06-30 | Stop reason: HOSPADM

## 2021-06-30 RX ORDER — ONDANSETRON 2 MG/ML
INJECTION INTRAMUSCULAR; INTRAVENOUS PRN
Status: DISCONTINUED | OUTPATIENT
Start: 2021-06-30 | End: 2021-06-30 | Stop reason: SDUPTHER

## 2021-06-30 RX ORDER — HYDROCODONE BITARTRATE AND ACETAMINOPHEN 5; 325 MG/1; MG/1
1 TABLET ORAL PRN
Status: DISCONTINUED | OUTPATIENT
Start: 2021-06-30 | End: 2021-06-30 | Stop reason: HOSPADM

## 2021-06-30 RX ORDER — SODIUM CHLORIDE 0.9 % (FLUSH) 0.9 %
10 SYRINGE (ML) INJECTION PRN
Status: DISCONTINUED | OUTPATIENT
Start: 2021-06-30 | End: 2021-06-30 | Stop reason: HOSPADM

## 2021-06-30 RX ORDER — SODIUM CHLORIDE 0.9 % (FLUSH) 0.9 %
10 SYRINGE (ML) INJECTION EVERY 12 HOURS SCHEDULED
Status: DISCONTINUED | OUTPATIENT
Start: 2021-06-30 | End: 2021-06-30 | Stop reason: HOSPADM

## 2021-06-30 RX ORDER — HYDROCODONE BITARTRATE AND ACETAMINOPHEN 5; 325 MG/1; MG/1
2 TABLET ORAL PRN
Status: DISCONTINUED | OUTPATIENT
Start: 2021-06-30 | End: 2021-06-30 | Stop reason: HOSPADM

## 2021-06-30 RX ADMIN — KETOROLAC TROMETHAMINE 1 DROP: 5 SOLUTION/ DROPS OPHTHALMIC at 06:40

## 2021-06-30 RX ADMIN — FENTANYL CITRATE 50 MCG: 50 INJECTION, SOLUTION INTRAMUSCULAR; INTRAVENOUS at 07:42

## 2021-06-30 RX ADMIN — ONDANSETRON 4 MG: 2 INJECTION INTRAMUSCULAR; INTRAVENOUS at 07:41

## 2021-06-30 RX ADMIN — PHENYLEPHRINE HYDROCHLORIDE 1 DROP: 100 SOLUTION/ DROPS OPHTHALMIC at 06:40

## 2021-06-30 RX ADMIN — FENTANYL CITRATE 50 MCG: 50 INJECTION, SOLUTION INTRAMUSCULAR; INTRAVENOUS at 07:41

## 2021-06-30 RX ADMIN — TROPICAMIDE 1 DROP: 10 SOLUTION/ DROPS OPHTHALMIC at 06:40

## 2021-06-30 RX ADMIN — PROPARACAINE HYDROCHLORIDE 1 DROP: 5 SOLUTION/ DROPS OPHTHALMIC at 06:40

## 2021-06-30 RX ADMIN — PROMETHAZINE HYDROCHLORIDE 12.5 MG: 25 INJECTION INTRAMUSCULAR; INTRAVENOUS at 08:18

## 2021-06-30 RX ADMIN — PROPARACAINE HYDROCHLORIDE 1 DROP: 5 SOLUTION/ DROPS OPHTHALMIC at 06:54

## 2021-06-30 RX ADMIN — SODIUM CHLORIDE, POTASSIUM CHLORIDE, SODIUM LACTATE AND CALCIUM CHLORIDE: 600; 310; 30; 20 INJECTION, SOLUTION INTRAVENOUS at 06:53

## 2021-06-30 ASSESSMENT — PAIN SCALES - GENERAL
PAINLEVEL_OUTOF10: 0

## 2021-06-30 ASSESSMENT — LIFESTYLE VARIABLES: SMOKING_STATUS: 0

## 2021-06-30 ASSESSMENT — PAIN - FUNCTIONAL ASSESSMENT: PAIN_FUNCTIONAL_ASSESSMENT: 0-10

## 2021-06-30 NOTE — OP NOTE
Ophthalmology Operative Note        Patient:  Boubacar Wiggins  :   1935  Age/Sex:  82 y.o. female  Attending:  Bradford Andersen DO  Mrn:   [id number]  Aracelisstephany:   [de-identified]       Adm:  2021  6:17 AM          PROCEDURE:  Phacoemulsification with PCIOL implant left eye    Date of Procedure: 2021    PREOPERATIVE DIAGNOSIS: Visually significant cataract left eye; Astigmatism    POSTOPERATIVE DIAGNOSIS: Visually significant cataract left eye; Astigmatism    Primary Surgeon: Radha Fraser    Consent Obtained: Obtained    Time Out Performed: Yes    ANESTHESIA: Mac Topical    ESTIMATED BLOOD LOSS:  Minimal    COMPLICATIONS:  None        DESCRIPTION OF PROCEDURE:      On the above-mentioned date, the patient was taken to the operative suite and prepared for ocular surgery. After the induction of sedation by the Department of Anesthesia, the operative eye was prepped and draped in usual sterile manner for ocular surgery. A lid speculum was placed to retract the upper and lower lids from each other. A paracentesis incision was made at the 12 o'clock position. Viscoelastic was then injected into the anterior chamber. A clear corneal incision was made at the 3 o'clock position, forming a corneal valve. A capsulorhexis was then performed with the Utrata forceps. BSS solution on a cannula was then used to hydrodissect the cortex from the lens capsule. Following this, the nucleus was rotated within the lens capsule. The phacoemulsifier was then introduced into the anterior chamber through the corneal valve incision and engaged the nucleus. Phacoemulsification was then carried out, dividing the nucleus into quadrants. The phacoemulsifier was then placed on burst mode and each of the quadrants was individually phacoemulsified and removed. Once all quadrants had been removed, the phacoemulsification needle was removed and exchanged for an irrigation/aspirating (I/A) hand piece.   This was employed to remove the cortical material remaining in the capsule. Once this was completed, the capsule was refilled with viscoelastic material.  The preselected intraocular lens was then placed in a lens injecting cartridge and passed through the corneal valve incision into the anterior chamber and thence into the capsular bag. It was then injected into the capsular bag and rotated into position with a Kuglen hook. It centered nicely, without difficulty. Once this was completed, the viscoelastic material was removed with the I/A device. The anterior chamber was reconstituted with BSS solution. The corneal valve was hydrated and the wound was checked for leaks. None were found, so it was elected to leave the wound sutureless. 0.1 cc of Vigamox was injected intracamerally and 4 mg of Kenalog was injected sub tenons. The drapes were removed and 2 drops of Vigamox solution were dropped into the eye. A clear plastic shield was placed over the eye and the patient was removed to the recovery area, having tolerated the procedure well with no complications.       Electronically signed by Rolf Ray DO on 6/30/2021 at 7:58 AM

## 2021-06-30 NOTE — H&P
Update History & Physical    The patient's History and Physical was reviewed with the patient and there were no significant changes. I examined the patient and there were no significant changes from the previous History and Physical.    Plan: The risk, benefits, expected outcome, and alternative to the recommended procedure have been discussed with the patient. Patient understands and wants to proceed with the procedure.     Electronically signed by Lupe Jovel DO on 6/30/21 at 7:43 AM EDT

## 2021-06-30 NOTE — ANESTHESIA POSTPROCEDURE EVALUATION
Department of Anesthesiology  Postprocedure Note    Patient: Ashia Palmer  MRN: [id number]  YOB: 1935  Date of evaluation: 6/30/2021  Time:  9:05 AM     Procedure Summary     Date: 06/30/21 Room / Location: [address] [place] [address] [facility]    Anesthesia Start: 6765 Anesthesia Stop: 0804    Procedure: CATARACT EXTRACTION WITH IOL IMPLANT-LEFT EYE (Left ) Diagnosis: (NUCLEAR SCLEROTIC CATARACT)    Surgeons: Michaelle Pelayo DO Responsible Provider: Sophie Ordoñez MD    Anesthesia Type: MAC ASA Status: 3          Anesthesia Type: MAC    Francis Phase I: Francis Score: 10    Francis Phase II: Francis Score: 10    Last vitals: Reviewed and per EMR flowsheets.        Anesthesia Post Evaluation    Patient location during evaluation: PACU  Patient participation: complete - patient participated  Level of consciousness: awake  Airway patency: patent  Nausea & Vomiting: nausea and no vomiting  Complications: no  Cardiovascular status: hemodynamically stable  Respiratory status: room air and spontaneous ventilation  Hydration status: stable  Comments: CHRIS treated with Phenergan

## 2021-11-08 LAB
ANION GAP SERPL CALCULATED.3IONS-SCNC: 15 MMOL/L (ref 7–16)
BUN BLDV-MCNC: 10 MG/DL (ref 6–23)
CALCIUM SERPL-MCNC: 9.1 MG/DL (ref 8.6–10.2)
CHLORIDE BLD-SCNC: 103 MMOL/L (ref 98–107)
CO2: 20 MMOL/L (ref 22–29)
CREAT SERPL-MCNC: 0.7 MG/DL (ref 0.5–1)
GFR AFRICAN AMERICAN: >60
GFR NON-AFRICAN AMERICAN: >60 ML/MIN/1.73
GLUCOSE BLD-MCNC: 151 MG/DL (ref 74–99)
POTASSIUM SERPL-SCNC: 4.4 MMOL/L (ref 3.5–5)
SODIUM BLD-SCNC: 138 MMOL/L (ref 132–146)

## 2021-11-09 LAB
HCT VFR BLD CALC: 40.1 % (ref 34–48)
HEMOGLOBIN: 12.2 G/DL (ref 11.5–15.5)
MCH RBC QN AUTO: 29.8 PG (ref 26–35)
MCHC RBC AUTO-ENTMCNC: 30.4 % (ref 32–34.5)
MCV RBC AUTO: 98 FL (ref 80–99.9)
PDW BLD-RTO: 14.3 FL (ref 11.5–15)
PLATELET # BLD: 293 E9/L (ref 130–450)
PMV BLD AUTO: 11.6 FL (ref 7–12)
RBC # BLD: 4.09 E12/L (ref 3.5–5.5)
WBC # BLD: 8 E9/L (ref 4.5–11.5)

## 2021-12-20 ENCOUNTER — IMMUNIZATION (OUTPATIENT)
Dept: PRIMARY CARE CLINIC | Age: 86
End: 2021-12-20
Payer: MEDICARE

## 2021-12-20 PROCEDURE — 0064A COVID-19, MODERNA BOOSTER VACCINE 0.25ML DOSE: CPT | Performed by: NURSE PRACTITIONER

## 2021-12-20 PROCEDURE — 91306 COVID-19, MODERNA BOOSTER VACCINE 0.25ML DOSE: CPT | Performed by: NURSE PRACTITIONER

## 2022-11-11 ENCOUNTER — HOSPITAL ENCOUNTER (EMERGENCY)
Age: 87
Discharge: HOME | End: 2022-11-11
Payer: MEDICARE

## 2022-11-11 VITALS
WEIGHT: 180 LBS | HEIGHT: 62 IN | DIASTOLIC BLOOD PRESSURE: 57 MMHG | SYSTOLIC BLOOD PRESSURE: 138 MMHG | BODY MASS INDEX: 33.13 KG/M2 | TEMPERATURE: 98.3 F | RESPIRATION RATE: 20 BRPM | HEART RATE: 60 BPM | OXYGEN SATURATION: 97 %

## 2022-11-11 DIAGNOSIS — Z51.89 VISIT FOR WOUND CHECK: Primary | ICD-10-CM

## 2022-11-11 PROCEDURE — 99211 OFF/OP EST MAY X REQ PHY/QHP: CPT

## 2022-11-11 ASSESSMENT — PAIN SCALES - GENERAL: PAINLEVEL_OUTOF10: 0

## 2022-11-11 ASSESSMENT — PAIN - FUNCTIONAL ASSESSMENT: PAIN_FUNCTIONAL_ASSESSMENT: 0-10

## 2022-11-11 NOTE — ED PROVIDER NOTES
Department of Emergency Medicine  [address] [place] [address]  Provider Note  Admit Date/Time: 2022  1:45 PM  Room:   NAME: Nickolas Brown  : 1935  MRN: [id number]     Chief Complaint:  Wound Check (States she was seen in ER on Monday and had two lacerations glued and the glue fell off and she is concerned they are ok )    History of Present Illness        Nickolas Brown is a 82 y.o. female who has a past medical history of:   Past Medical History:   Diagnosis Date    Anxiety     Anxiety     Arthritis     Bronchitis, chronic (HCC)     CAD (coronary artery disease)     Dry eye syndrome     GERD (gastroesophageal reflux disease)     Hiatal hernia     Hyperlipidemia     Hypertension     Macular degeneration     lily    Reflux     Thyroid disease     goiter\" watching it- Dr. Ezequiel Marquez"    presents to the urgent care center by private car for relation of wounds on her second and third fingers. Her right hand. She cut them on Monday which was 5 days ago went to the emergency department they glued them, the glue came off of the second finger wound and she was concerned that it was the glue was not on long enough so she came in for evaluation. She said she is not having any problems with the wrist wound does not have any other complaints she just wanted to see if it needed further repair. ROS    Pertinent positives and negatives are stated within HPI, all other systems reviewed and are negative.     Past Surgical History:   Procedure Laterality Date    CARDIAC SURGERY      heart cath 11/29/10 with stent placed    COLONOSCOPY  2016    DILATION AND CURETTAGE OF UTERUS      INTRACAPSULAR CATARACT EXTRACTION Right 2021    CATARACT EXTRACTION WITH IOL IMPLANT-RIGHT EYE performed by Lilian Ramos DO at [address] [place] [address] Left 2021    CATARACT EXTRACTION WITH IOL IMPLANT-LEFT EYE performed by Lilian Ramos DO at [address] [place] [address] VASCULAR SURGERY  11/29/2010    1 - stent:  Dr. Pancho Mercado- 1/21   Social History:  reports that she has never smoked. She has never used smokeless tobacco. She reports that she does not drink alcohol and does not use drugs. Family History: family history includes Other in her father; Stroke in her mother. Allergies: Alendronate sodium, Celebrex [celecoxib], Ciloxan [ciprofloxacin], Polymyxin b-hc [otobiotic], Statins [statins], Sulfa antibiotics, Codeine, Fluconazole, Levofloxacin, and Vicodin [hydrocodone-acetaminophen]    Physical Exam   Oxygen Saturation Interpretation: Normal.   ED Triage Vitals [11/11/22 1351]   BP Temp Temp Source Heart Rate Resp SpO2 Height Weight   (!) 138/57 98.3 °F (36.8 °C) Infrared 60 20 (!) 20 % 5' 2\" (1.575 m) 180 lb (81.6 kg)       Physical Exam  Constitutional/General: Alert and oriented x3, well appearing, non toxic in NAD  HEENT:  NC/NT. [place]ch Neck: Supple, full ROM,   Respiratory:. Not in respiratory distress  CV:  Regular rate. Regular rhythm. Musculoskeletal: Moves all extremities x 4. Integument: skin warm and dry. No rashes. She has healing wounds on the second and third finger, they are crusted over there is no redness drainage swelling or signs of infection. There is still some glue on the third finger but the index finger glue has come off  Lymphatic: no lymphadenopathy noted  Neurologic: GCS 15, no focal deficits, symmetric strength 5/5 in the upper and lower extremities bilaterally  Psychiatric: Normal Affect    Lab / Imaging Results   (All laboratory and radiology results have been personally reviewed by myself)  Labs:  No results found for this visit on 11/11/22. Imaging: All Radiology results interpreted by Radiologist unless otherwise noted.   No orders to display       ED Course / Medical Decision Making   Medications - No data to display       Consult(s):   None    MDM:   Patient here for evaluation of wounds on her fingers the fingers wounds were glued on Monday which was 5 days ago she is concerned because the glue came off. Called her doctor and he told her to come in to have them evaluated. I did evaluate the wounds are scabbed over they are healing there is no signs of infection there is no redness drainage or swelling. I did tell her no further repair needs to be done that just has to heal and no further intervention needs to be done if she develops redness drainage swelling or any signs of infection she should get the wound rechecked. Assessment      1. Visit for wound check      Plan   Discharge to home and advised to contact Bonny Duong [phone]    Schedule an appointment as soon as possible for a visit      Patient condition is good    New Medications     New Prescriptions    No medications on file     Electronically signed by JACKELYN Abreu CNP   DD: 11/11/22  **This report was transcribed using voice recognition software. Every effort was made to ensure accuracy; however, inadvertent computerized transcription errors may be present.   END OF ED PROVIDER NOTE      JACKELYN Abreu CNP  11/11/22 9930

## 2022-12-30 ENCOUNTER — HOSPITAL ENCOUNTER (EMERGENCY)
Age: 87
Discharge: HOME | End: 2022-12-30
Payer: MEDICARE

## 2022-12-30 ENCOUNTER — APPOINTMENT (OUTPATIENT)
Dept: GENERAL RADIOLOGY | Age: 87
End: 2022-12-30
Payer: MEDICARE

## 2022-12-30 VITALS
HEIGHT: 62 IN | SYSTOLIC BLOOD PRESSURE: 136 MMHG | DIASTOLIC BLOOD PRESSURE: 64 MMHG | OXYGEN SATURATION: 99 % | HEART RATE: 56 BPM | WEIGHT: 175 LBS | RESPIRATION RATE: 18 BRPM | TEMPERATURE: 97.4 F | BODY MASS INDEX: 32.2 KG/M2

## 2022-12-30 DIAGNOSIS — S39.012A BACK STRAIN, INITIAL ENCOUNTER: Primary | ICD-10-CM

## 2022-12-30 DIAGNOSIS — N39.0 URINARY TRACT INFECTION WITHOUT HEMATURIA, SITE UNSPECIFIED: ICD-10-CM

## 2022-12-30 DIAGNOSIS — R10.9 FLANK PAIN: ICD-10-CM

## 2022-12-30 LAB
BACTERIA: ABNORMAL /HPF
BILIRUBIN URINE: NEGATIVE
BLOOD, URINE: NEGATIVE
CLARITY: CLEAR
COLOR: YELLOW
EPITHELIAL CELLS, UA: ABNORMAL /HPF
GLUCOSE URINE: NEGATIVE MG/DL
KETONES, URINE: NEGATIVE MG/DL
LEUKOCYTE ESTERASE, URINE: ABNORMAL
NITRITE, URINE: NEGATIVE
PH UA: 5 (ref 5–9)
PROTEIN UA: NEGATIVE MG/DL
RBC UA: ABNORMAL /HPF (ref 0–2)
SPECIFIC GRAVITY UA: >=1.03 (ref 1–1.03)
UROBILINOGEN, URINE: 0.2 E.U./DL
WBC UA: ABNORMAL /HPF (ref 0–5)

## 2022-12-30 PROCEDURE — 99211 OFF/OP EST MAY X REQ PHY/QHP: CPT

## 2022-12-30 PROCEDURE — 81001 URINALYSIS AUTO W/SCOPE: CPT

## 2022-12-30 PROCEDURE — 72072 X-RAY EXAM THORAC SPINE 3VWS: CPT

## 2022-12-30 PROCEDURE — 71046 X-RAY EXAM CHEST 2 VIEWS: CPT

## 2022-12-30 RX ORDER — CEFDINIR 300 MG/1
300 CAPSULE ORAL 2 TIMES DAILY
Qty: 14 CAPSULE | Refills: 0 | Status: SHIPPED | OUTPATIENT
Start: 2022-12-30 | End: 2023-01-06

## 2022-12-30 RX ORDER — MENTHOL 40 MG/ML
GEL TOPICAL
Qty: 1 EACH | Refills: 0 | Status: SHIPPED | OUTPATIENT
Start: 2022-12-30

## 2022-12-30 ASSESSMENT — PAIN DESCRIPTION - DESCRIPTORS: DESCRIPTORS: SHARP

## 2022-12-30 ASSESSMENT — PAIN DESCRIPTION - PAIN TYPE: TYPE: ACUTE PAIN

## 2022-12-30 ASSESSMENT — PAIN DESCRIPTION - FREQUENCY: FREQUENCY: CONTINUOUS

## 2022-12-30 ASSESSMENT — PAIN DESCRIPTION - ONSET: ONSET: GRADUAL

## 2022-12-30 ASSESSMENT — PAIN DESCRIPTION - ORIENTATION: ORIENTATION: RIGHT;MID

## 2022-12-30 ASSESSMENT — PAIN - FUNCTIONAL ASSESSMENT: PAIN_FUNCTIONAL_ASSESSMENT: 0-10

## 2022-12-30 ASSESSMENT — PAIN SCALES - GENERAL: PAINLEVEL_OUTOF10: 8

## 2022-12-30 ASSESSMENT — PAIN DESCRIPTION - LOCATION: LOCATION: BACK;RIB CAGE

## 2022-12-30 NOTE — ED PROVIDER NOTES
Department of Emergency [address] [facility]  Provider Note  Admit Date/Time: 2022 12:53 PM  Room:   NAME: Farhana Cates  : 1935  MRN: [id number]     Chief Complaint:  Back Pain (Having right mid back pain that goes into right rib area. Denies injury. Pain increases with deep inspiration.)    History of Present Illness        Farhana Cates is a 82 y.o. female who has a past medical history of:   Past Medical History:   Diagnosis Date    Anxiety     Anxiety     Arthritis     Bronchitis, chronic (HCC)     CAD (coronary artery disease)     Dry eye syndrome     GERD (gastroesophageal reflux disease)     Hiatal hernia     Hyperlipidemia     Hypertension     Macular degeneration     lily    Reflux     Thyroid disease     goiter\" watching it- Dr. Milton Rose"    presents to the urgent care center by private car for evaluation. She has had pain in the right flank  area for about 10 days. She said there was no injury she said the pain is worse when she moves. Pleasant Bumpers that she has a chronic cough but that is unchanged she said she has not been sick does not feel short of breath does not have any anterior chest pain. ROS    Pertinent positives and negatives are stated within HPI, all other systems reviewed and are negative. Past Surgical History:   Procedure Laterality Date    CARDIAC SURGERY      heart cath 11/29/10 with stent placed    COLONOSCOPY  2016    DILATION AND CURETTAGE OF UTERUS      INTRACAPSULAR CATARACT EXTRACTION Right 2021    CATARACT EXTRACTION WITH IOL IMPLANT-RIGHT EYE performed by Ana Barney DO at [address] [place] [address] Left 2021    CATARACT EXTRACTION WITH IOL IMPLANT-LEFT EYE performed by Ana Barney DO at [address] [place] [address]  2010    1 - stent:  Dr. Ruby Berger-    Social History:  reports that she has never smoked.  She has never used smokeless tobacco. She reports that she does not drink alcohol and does not use drugs. Family History: family history includes Other in her father; Stroke in her mother. Allergies: Alendronate sodium, Celebrex [celecoxib], Ciloxan [ciprofloxacin], Polymyxin b-hc [otobiotic], Statins [statins], Sulfa antibiotics, Codeine, Fluconazole, Levofloxacin, and Vicodin [hydrocodone-acetaminophen]    Physical Exam   Oxygen Saturation Interpretation: Normal.   ED Triage Vitals [12/30/22 1256]   BP Temp Temp Source Heart Rate Resp SpO2 Height Weight   136/64 97.4 °F (36.3 °C) Infrared 56 18 99 % 5' 2\" (1.575 m) 175 lb (79.4 kg)       Physical Exam  Constitutional/General: Alert and oriented x3, well appearing, non toxic in NAD  HEENT:  NC/NT. Neck: Supple, full ROM, non tender to palpation in the midline,   Respiratory: Lungs clear to auscultation bilaterally  CV:  Regular rate. Regular rhythm. GI:  Abdomen Soft, Non tender, Non distended. +BS. No CVA tenderness  Musculoskeletal: Moves all extremities x 4. No midline thoracic or lumbar spine tenderness  Integument: skin warm and dry. No rashes.    Lymphatic: no lymphadenopathy noted  Neurologic: GCS 15, no focal deficits, symmetric strength 5/5 in the upper and lower extremities bilaterally  Psychiatric: Normal Affect    Lab / Imaging Results   (All laboratory and radiology results have been personally reviewed by myself)  Labs:  Results for orders placed or performed during the hospital encounter of 12/30/22   Urinalysis   Result Value Ref Range    Color, UA Yellow Straw/Yellow    Clarity, UA Clear Clear    Glucose, Ur Negative Negative mg/dL    Bilirubin Urine Negative Negative    Ketones, Urine Negative Negative mg/dL    Specific Gravity, UA >=1.030 1.005 - 1.030    Blood, Urine Negative Negative    pH, UA 5.0 5.0 - 9.0    Protein, UA Negative Negative mg/dL    Urobilinogen, Urine 0.2 <2.0 E.U./dL    Nitrite, Urine Negative Negative    Leukocyte Esterase, Urine TRACE (A) Negative   Microscopic Urinalysis Result Value Ref Range    WBC, UA 5-10 (A) 0 - 5 /HPF    RBC, UA NONE 0 - 2 /HPF    Epithelial Cells, UA RARE /HPF    Bacteria, UA FEW (A) None Seen /HPF     Imaging: All Radiology results interpreted by Radiologist unless otherwise noted. XR CHEST (2 VW)   Final Result   No pneumonia or pleural effusion. XR THORACIC SPINE (3 VIEWS)   Final Result   No pneumonia or pleural effusion. ED Course / Medical Decision Making   Medications - No data to display       Consult(s):   None  MDM:   He Julieth Bi that this pain is lower than the posterior lung area its more over the mid back area its not tender to touch I did look for a rash there to rule out shingles and I do not see any rash. I considered the visit that it could be a UTI and did check a urine and she does have findings of a UTI. She is not febrile she is not short of breath she does not have any anterior chest pain her sats 99% and her heart rate is not fast she is running at 56. I do not really suspect that she has a PE she is not really symptomatic for that it seems to be more musculoskeletal since it is worse when she moves she has had a cough and she said that is chronic I did a chest x-ray and that was negative. Thoracic spine also they did not report any abnormalities. I did advise her this seems to be musculoskeletal pain and a UTI I did put her on Omnicef for the UTI and some Biofreeze for the back pain advised her to take over-the-counter Tylenol as needed and advised her if she has worsening symptoms such as shortness of breath or worsening symptoms she needs to go to the emergency department and she was informed of that. Plan of Care/Counseling:  I reviewed today's visit with the patient in addition to providing specific details for the plan of care and counseling regarding the diagnosis and prognosis. Questions are answered at this time and are agreeable with the plan. Assessment      1.  Back strain, initial encounter 2. Urinary tract infection without hematuria, site unspecified    3. Flank pain      Plan   Discharge to home and advised to contact Bonny Diez  [facility] File [phone]    Schedule an appointment as soon as possible for a visit    Patient condition is good    New Medications     New Prescriptions    CEFDINIR (OMNICEF) 300 MG CAPSULE    Take 1 capsule by mouth 2 times daily for 7 days    MENTHOL, TOPICAL ANALGESIC, (BIOFREEZE) 4 % GEL    Apply to painful areas on back bid prn     Electronically signed by JACKELYN Patel CNP   DD: 12/30/22  **This report was transcribed using voice recognition software. Every effort was made to ensure accuracy; however, inadvertent computerized transcription errors may be present.   END OF ED PROVIDER NOTE      JACKELYN Patel CNP  12/30/22 2450

## 2023-03-30 ENCOUNTER — TELEPHONE (OUTPATIENT)
Dept: ADMINISTRATIVE | Age: 88
End: 2023-03-30

## 2023-03-30 DIAGNOSIS — M25.512 LEFT SHOULDER PAIN, UNSPECIFIED CHRONICITY: Primary | ICD-10-CM

## 2023-03-30 NOTE — TELEPHONE ENCOUNTER
Fernie(son) requesting to reschedule Fracture appointment on 4/4/2023 with Dr. Valentin Hernandez as patient may be leaving [place]. Please advise for reschedule       Tiffanie Deng was also advised by [facility] for Dr. Valentin Hernandez office to request previous notes on patient Fracture for continuing of care.

## 2023-04-03 DIAGNOSIS — M25.512 LEFT SHOULDER PAIN, UNSPECIFIED CHRONICITY: Primary | ICD-10-CM

## 2023-04-17 ENCOUNTER — TELEPHONE (OUTPATIENT)
Dept: ORTHOPEDIC SURGERY | Age: 88
End: 2023-04-17

## 2023-04-17 DIAGNOSIS — S42.215A CLOSED NONDISPLACED FRACTURE OF SURGICAL NECK OF LEFT HUMERUS, UNSPECIFIED FRACTURE MORPHOLOGY, INITIAL ENCOUNTER: Primary | ICD-10-CM

## 2023-04-17 RX ORDER — HYDROCODONE BITARTRATE AND ACETAMINOPHEN 5; 325 MG/1; MG/1
1 TABLET ORAL EVERY 6 HOURS PRN
Qty: 28 TABLET | Refills: 0 | Status: SHIPPED | OUTPATIENT
Start: 2023-04-17 | End: 2023-04-24

## 2023-04-17 NOTE — TELEPHONE ENCOUNTER
Just release from facility     . Last appointment 4/6/2023  Next appointment   Future Appointments   Date Time Provider Emiliana Langston   5/5/2023 11:15 AM JACKELYN Dumas CNP [facility]      Last refill:    DOS: FX 03/28/2023      Patient called in requesting refill of:    [facility] ([place] [address], [address] [phone] [facility] [phone]   [address], [address] [address]

## 2023-04-26 ENCOUNTER — TELEPHONE (OUTPATIENT)
Dept: ORTHOPEDIC SURGERY | Age: 88
End: 2023-04-26

## 2023-04-26 NOTE — TELEPHONE ENCOUNTER
Last appointment Visit 4/6/23  Next appointment   Future Appointments   Date Time Provider Emiliana Langston   5/5/2023 11:15 AM Abbie Abel, APRN - CNP [facility]      Last refill 4/17/23  Date of fx: 3/28/23       Patient called in requesting refill of    [address] [facility] [address], [address] [phone] Leeanna Carry [phone]   [address], [address] [address]

## 2023-04-27 DIAGNOSIS — S42.215A CLOSED NONDISPLACED FRACTURE OF SURGICAL NECK OF LEFT HUMERUS, UNSPECIFIED FRACTURE MORPHOLOGY, INITIAL ENCOUNTER: ICD-10-CM

## 2023-04-27 RX ORDER — HYDROCODONE BITARTRATE AND ACETAMINOPHEN 5; 325 MG/1; MG/1
1 TABLET ORAL EVERY 6 HOURS PRN
Qty: 28 TABLET | Refills: 0 | Status: SHIPPED | OUTPATIENT
Start: 2023-04-27 | End: 2023-05-04

## 2023-05-02 DIAGNOSIS — M25.511 RIGHT SHOULDER PAIN, UNSPECIFIED CHRONICITY: ICD-10-CM

## 2023-05-02 DIAGNOSIS — S42.215A CLOSED NONDISPLACED FRACTURE OF SURGICAL NECK OF LEFT HUMERUS, UNSPECIFIED FRACTURE MORPHOLOGY, INITIAL ENCOUNTER: Primary | ICD-10-CM

## 2023-05-05 ENCOUNTER — OFFICE VISIT (OUTPATIENT)
Dept: ORTHOPEDIC SURGERY | Age: 88
End: 2023-05-05

## 2023-05-05 VITALS — BODY MASS INDEX: 32.2 KG/M2 | TEMPERATURE: 98 F | HEIGHT: 62 IN | WEIGHT: 175 LBS

## 2023-05-05 DIAGNOSIS — M25.811 IMPINGEMENT OF RIGHT SHOULDER: ICD-10-CM

## 2023-05-05 DIAGNOSIS — S42.215A CLOSED NONDISPLACED FRACTURE OF SURGICAL NECK OF LEFT HUMERUS, UNSPECIFIED FRACTURE MORPHOLOGY, INITIAL ENCOUNTER: Primary | ICD-10-CM

## 2023-05-05 PROCEDURE — 99024 POSTOP FOLLOW-UP VISIT: CPT | Performed by: NURSE PRACTITIONER

## 2023-05-05 RX ORDER — HYDROCODONE BITARTRATE AND ACETAMINOPHEN 5; 325 MG/1; MG/1
1 TABLET ORAL EVERY 6 HOURS PRN
Qty: 28 TABLET | Refills: 0 | Status: SHIPPED | OUTPATIENT
Start: 2023-05-05 | End: 2023-05-12

## 2023-05-05 NOTE — PROGRESS NOTES
Baldev Drummond is a 82y.o. year old female who presents today for evaluation of a left shoulder proximal humerus fracture which occurred on 3/28/23. The patient reports that this injury occurred when she landed on her shoulder. Her right shoulder is hurting as well    The patient's past medical history, medications, and review of systems was reviewed. On Physical Exam, Baldev Drummond is well-developed, well-nourished, and oriented to person, place and time. her gait is intact. On evaluation of her left upper extremity, there is obvious deformity. There is swelling and is ecchymosis. she is tender to palpation over the proximal humerus, and otherwise nontender over the remainder of the extremity. Range of motion is decreased secondary to pain over the left shoulder. The skin overlying the left upper extremity is intact without evidence of lesion, laceration or abrasion. Distal pulses are 2+ and symmetric bilaterally. Sensation is grossly intact to light touch and symmetric bilaterally. +right shoulder impingement and hylton  Xrays:      Fractures of the humeral head and neck as well as inferior glenoid. Alignment at the glenohumeral joint suggests that there may be a hemarthrosis   present. No fracture to right shoulder  Impression:    Encounter Diagnoses   Name Primary?     Closed nondisplaced fracture of surgical neck of left humerus, unspecified fracture morphology, initial encounter Yes    Impingement of right shoulder          Plan:   Dc sling  Home PT for ROM left shoulder, and for right shoulder-treating impingement  No heavy lifting pushing or pulling LUE  Fu in 2 months with xr  Oarrs run, norco refilled

## 2023-05-15 ENCOUNTER — TELEPHONE (OUTPATIENT)
Dept: ORTHOPEDIC SURGERY | Age: 88
End: 2023-05-15

## 2023-05-15 DIAGNOSIS — S42.215A CLOSED NONDISPLACED FRACTURE OF SURGICAL NECK OF LEFT HUMERUS, UNSPECIFIED FRACTURE MORPHOLOGY, INITIAL ENCOUNTER: ICD-10-CM

## 2023-05-15 RX ORDER — HYDROCODONE BITARTRATE AND ACETAMINOPHEN 5; 325 MG/1; MG/1
1 TABLET ORAL EVERY 8 HOURS PRN
Qty: 21 TABLET | Refills: 0 | Status: SHIPPED | OUTPATIENT
Start: 2023-05-15 | End: 2023-05-22

## 2023-05-15 NOTE — TELEPHONE ENCOUNTER
.Last appointment 4/6/2023  Next appointment   Future Appointments   Date Time Provider Emiliana Langston   7/10/2023 11:30 AM DO Jagdish Monzon [facility]      Last refill:  05/05/2023  DOS: Pola Dumas 04/06/2023      Patient called in requesting refill of:    HYDROcodone-acetaminophen ([facility]) 5-325 MG per tablet     [facility], [address] [place] [address] [phone] [facility] [phone]   [facility] [address] [address]

## 2023-05-22 ENCOUNTER — TELEPHONE (OUTPATIENT)
Dept: ORTHOPEDIC SURGERY | Age: 88
End: 2023-05-22

## 2023-05-22 DIAGNOSIS — S42.215A CLOSED NONDISPLACED FRACTURE OF SURGICAL NECK OF LEFT HUMERUS, UNSPECIFIED FRACTURE MORPHOLOGY, INITIAL ENCOUNTER: ICD-10-CM

## 2023-05-22 NOTE — TELEPHONE ENCOUNTER
.Last appointment 5/5/23  Next appointment          Future Appointments   Date Time Provider Emiliana Langston   7/10/2023 11:30 AM DO Raf Hebert [facility]      Last refill:  05/05/2023  DOS: Katy Ye 04/06/2023        Patient called in requesting refill of:     HYDROcodone-acetaminophen ([facility]) 5-325 MG per tablet      [facility], [address] [place] [address] [phone] Izabel Falk [phone]   [facility] [address] [address]

## 2023-05-23 RX ORDER — HYDROCODONE BITARTRATE AND ACETAMINOPHEN 5; 325 MG/1; MG/1
1 TABLET ORAL EVERY 8 HOURS PRN
Qty: 21 TABLET | Refills: 0 | Status: SHIPPED | OUTPATIENT
Start: 2023-05-23 | End: 2023-05-30

## 2023-06-05 ENCOUNTER — OFFICE VISIT (OUTPATIENT)
Dept: ORTHOPEDIC SURGERY | Age: 88
End: 2023-06-05

## 2023-06-05 VITALS — HEIGHT: 62 IN | BODY MASS INDEX: 32.2 KG/M2 | TEMPERATURE: 98 F | WEIGHT: 175 LBS

## 2023-06-05 DIAGNOSIS — S42.215A CLOSED NONDISPLACED FRACTURE OF SURGICAL NECK OF LEFT HUMERUS, UNSPECIFIED FRACTURE MORPHOLOGY, INITIAL ENCOUNTER: Primary | ICD-10-CM

## 2023-06-05 PROCEDURE — 99024 POSTOP FOLLOW-UP VISIT: CPT | Performed by: NURSE PRACTITIONER

## 2023-06-05 RX ORDER — TRAMADOL HYDROCHLORIDE 50 MG/1
50 TABLET ORAL EVERY 6 HOURS PRN
Qty: 28 TABLET | Refills: 0 | Status: SHIPPED | OUTPATIENT
Start: 2023-06-05 | End: 2023-06-12

## 2023-06-05 NOTE — PROGRESS NOTES
Ian Parada is a 82y.o. year old female who presents today for evaluation of a left shoulder proximal humerus fracture which occurred on 3/28/23. The patient reports that this injury occurred when she landed on her shoulder. Shes been doing home PT and has had increased pain. The patient's past medical history, medications, and review of systems was reviewed. On Physical Exam, Ian Parada is well-developed, well-nourished, and oriented to person, place and time. her gait is intact. On evaluation of her left upper extremity, there is obvious deformity. There is swelling and is ecchymosis. she is tender to palpation over the proximal humerus, and otherwise nontender over the remainder of the extremity. Range of motion is decreased secondary to pain over the left shoulder. The skin overlying the left upper extremity is intact without evidence of lesion, laceration or abrasion. Distal pulses are 2+ and symmetric bilaterally. Sensation is grossly intact to light touch and symmetric bilaterally. +right shoulder impingement and hylton  Xrays:      Fractures of the humeral head and neck as well as inferior glenoid with healing noted   Alignment at the glenohumeral joint suggests that there may be a hemarthrosis   present. No fracture to right shoulder  Impression:    Encounter Diagnosis   Name Primary?     Closed nondisplaced fracture of surgical neck of left humerus, unspecified fracture morphology, initial encounter Yes         Plan:   Continue/finish home PT  Outpatient PT script given  HEP  Oarrs run  Tramadol  Fu as previously scheduled

## 2023-06-23 ENCOUNTER — TELEPHONE (OUTPATIENT)
Dept: ORTHOPEDIC SURGERY | Age: 88
End: 2023-06-23

## 2023-06-23 DIAGNOSIS — S42.215A CLOSED NONDISPLACED FRACTURE OF SURGICAL NECK OF LEFT HUMERUS, UNSPECIFIED FRACTURE MORPHOLOGY, INITIAL ENCOUNTER: ICD-10-CM

## 2023-06-23 RX ORDER — TRAMADOL HYDROCHLORIDE 50 MG/1
50 TABLET ORAL EVERY 6 HOURS PRN
Qty: 28 TABLET | Refills: 0 | Status: SHIPPED | OUTPATIENT
Start: 2023-06-23 | End: 2023-06-30

## 2023-07-05 DIAGNOSIS — S42.215A CLOSED NONDISPLACED FRACTURE OF SURGICAL NECK OF LEFT HUMERUS, UNSPECIFIED FRACTURE MORPHOLOGY, INITIAL ENCOUNTER: Primary | ICD-10-CM

## 2023-07-10 ENCOUNTER — OFFICE VISIT (OUTPATIENT)
Dept: ORTHOPEDIC SURGERY | Age: 88
End: 2023-07-10
Payer: MEDICARE

## 2023-07-10 VITALS — WEIGHT: 163 LBS | BODY MASS INDEX: 30 KG/M2 | HEIGHT: 62 IN | TEMPERATURE: 98 F

## 2023-07-10 DIAGNOSIS — M25.811 IMPINGEMENT OF RIGHT SHOULDER: ICD-10-CM

## 2023-07-10 DIAGNOSIS — S42.215A CLOSED NONDISPLACED FRACTURE OF SURGICAL NECK OF LEFT HUMERUS, UNSPECIFIED FRACTURE MORPHOLOGY, INITIAL ENCOUNTER: Primary | ICD-10-CM

## 2023-07-10 PROCEDURE — G8400 PT W/DXA NO RESULTS DOC: HCPCS | Performed by: ORTHOPAEDIC SURGERY

## 2023-07-10 PROCEDURE — G8427 DOCREV CUR MEDS BY ELIG CLIN: HCPCS | Performed by: ORTHOPAEDIC SURGERY

## 2023-07-10 PROCEDURE — 1036F TOBACCO NON-USER: CPT | Performed by: ORTHOPAEDIC SURGERY

## 2023-07-10 PROCEDURE — 1123F ACP DISCUSS/DSCN MKR DOCD: CPT | Performed by: ORTHOPAEDIC SURGERY

## 2023-07-10 PROCEDURE — 99213 OFFICE O/P EST LOW 20 MIN: CPT | Performed by: ORTHOPAEDIC SURGERY

## 2023-07-10 PROCEDURE — 1090F PRES/ABSN URINE INCON ASSESS: CPT | Performed by: ORTHOPAEDIC SURGERY

## 2023-07-10 PROCEDURE — G8417 CALC BMI ABV UP PARAM F/U: HCPCS | Performed by: ORTHOPAEDIC SURGERY

## 2023-07-19 DIAGNOSIS — S42.215A CLOSED NONDISPLACED FRACTURE OF SURGICAL NECK OF LEFT HUMERUS, UNSPECIFIED FRACTURE MORPHOLOGY, INITIAL ENCOUNTER: Primary | ICD-10-CM

## 2023-09-07 DIAGNOSIS — S42.215A CLOSED NONDISPLACED FRACTURE OF SURGICAL NECK OF LEFT HUMERUS, UNSPECIFIED FRACTURE MORPHOLOGY, INITIAL ENCOUNTER: Primary | ICD-10-CM

## 2023-09-11 ENCOUNTER — OFFICE VISIT (OUTPATIENT)
Dept: ORTHOPEDIC SURGERY | Age: 88
End: 2023-09-11

## 2023-09-11 VITALS — WEIGHT: 163 LBS | HEIGHT: 62 IN | BODY MASS INDEX: 30 KG/M2 | TEMPERATURE: 98 F

## 2023-09-11 DIAGNOSIS — S42.215A CLOSED NONDISPLACED FRACTURE OF SURGICAL NECK OF LEFT HUMERUS, UNSPECIFIED FRACTURE MORPHOLOGY, INITIAL ENCOUNTER: Primary | ICD-10-CM

## 2023-09-11 DIAGNOSIS — M25.812 SHOULDER IMPINGEMENT, LEFT: ICD-10-CM

## 2023-09-11 NOTE — PROGRESS NOTES
Chief Complaint   Patient presents with    Shoulder Pain     Left Shoulder FX F/U DOI 03/28/2023       Nino Lewis returns today for follow up of her left shoulder fracture. she reports that the pain in the shoulder is better. she has not been going to physical therapy. she is also complaining of left shoulder pain. The patient is right dominant. The patient's pain level is a 6/10. The patient did respond to treatment.     Past Medical History:   Diagnosis Date    Anxiety     Anxiety     Arthritis     Bronchitis, chronic (HCC)     CAD (coronary artery disease)     Dry eye syndrome     GERD (gastroesophageal reflux disease)     Hiatal hernia     Hyperlipidemia     Hypertension     Macular degeneration     lily    Reflux     Thyroid disease     goiter\" watching it- Dr. Clark Mattson"     Past Surgical History:   Procedure Laterality Date    CARDIAC SURGERY      heart cath 11/29/10 with stent placed    COLONOSCOPY  06/03/2016    DILATION AND CURETTAGE OF UTERUS      INTRACAPSULAR CATARACT EXTRACTION Right 06/16/2021    CATARACT EXTRACTION WITH IOL IMPLANT-RIGHT EYE performed by Corina Garcia DO at [address] [facility] Left 6/30/2021    CATARACT EXTRACTION WITH IOL IMPLANT-LEFT EYE performed by Corina Garcia DO at [address] [facility]  11/29/2010    1 - stent:  Dr. Crystal Macias- 1/21       Current Outpatient Medications:     Gabapentin (NEURONTIN PO), Take by mouth, Disp: , Rfl:     Menthol, Topical Analgesic, (BIOFREEZE) 4 % GEL, Apply to painful areas on back bid prn, Disp: 1 each, Rfl: 0    ondansetron (ZOFRAN ODT) 4 MG disintegrating tablet, Take 1 tablet by mouth every 12 hours as needed for Nausea or Vomiting, Disp: 2 tablet, Rfl: 0    torsemide (DEMADEX) 5 MG tablet, Take 1 tablet by mouth daily as needed, Disp: , Rfl:     ezetimibe (ZETIA) 10 MG tablet, Take 1 tablet by mouth nightly, Disp: , Rfl:     Coenzyme Q10 (COQ10) 200 MG CAPS, Take by mouth Daily,

## 2023-09-14 RX ORDER — TRIAMCINOLONE ACETONIDE 40 MG/ML
40 INJECTION, SUSPENSION INTRA-ARTICULAR; INTRAMUSCULAR ONCE
Status: COMPLETED | OUTPATIENT
Start: 2023-09-14 | End: 2023-09-14

## 2023-09-14 RX ADMIN — TRIAMCINOLONE ACETONIDE 40 MG: 40 INJECTION, SUSPENSION INTRA-ARTICULAR; INTRAMUSCULAR at 14:50

## 2023-10-12 ENCOUNTER — OFFICE VISIT (OUTPATIENT)
Dept: ORTHOPEDIC SURGERY | Age: 88
End: 2023-10-12
Payer: MEDICARE

## 2023-10-12 VITALS — HEIGHT: 62 IN | TEMPERATURE: 98 F | BODY MASS INDEX: 30 KG/M2 | WEIGHT: 163 LBS

## 2023-10-12 DIAGNOSIS — S42.215A CLOSED NONDISPLACED FRACTURE OF SURGICAL NECK OF LEFT HUMERUS, UNSPECIFIED FRACTURE MORPHOLOGY, INITIAL ENCOUNTER: Primary | ICD-10-CM

## 2023-10-12 PROCEDURE — 1123F ACP DISCUSS/DSCN MKR DOCD: CPT | Performed by: ORTHOPAEDIC SURGERY

## 2023-10-12 PROCEDURE — 1090F PRES/ABSN URINE INCON ASSESS: CPT | Performed by: ORTHOPAEDIC SURGERY

## 2023-10-12 PROCEDURE — 1036F TOBACCO NON-USER: CPT | Performed by: ORTHOPAEDIC SURGERY

## 2023-10-12 PROCEDURE — G8427 DOCREV CUR MEDS BY ELIG CLIN: HCPCS | Performed by: ORTHOPAEDIC SURGERY

## 2023-10-12 PROCEDURE — 99213 OFFICE O/P EST LOW 20 MIN: CPT | Performed by: ORTHOPAEDIC SURGERY

## 2023-10-12 PROCEDURE — G8484 FLU IMMUNIZE NO ADMIN: HCPCS | Performed by: ORTHOPAEDIC SURGERY

## 2023-10-12 PROCEDURE — G8417 CALC BMI ABV UP PARAM F/U: HCPCS | Performed by: ORTHOPAEDIC SURGERY

## 2023-10-12 PROCEDURE — G8400 PT W/DXA NO RESULTS DOC: HCPCS | Performed by: ORTHOPAEDIC SURGERY

## 2023-10-12 NOTE — PROGRESS NOTES
normal;  Motor exam of the upper extremities show: The reflexes in biceps/triceps/brachioradialis are equal and symmetric. Sensory exam C5-T1 are normal bilaterally. Cardiovascular: The vascular exam is normal and is well perfused to distal extremities. There are 2+ radial pulses bilaterally, and motor and sensation is intact to median, ulnar, and radial, musclocutaneus, and axillary nerve distribution and grossly symmetric bilaterally. There is cap refill noted less than two seconds in all digits. There is not edema of the bilateral upper extremities. There is not varicosities noted in the distal extremities. Lymph:  Upon palpation,  there is no lymphadenopathy noted in bilateral upper extremities. Musculoskeletal:  Gait: normal; examination of the nails and digits reveal no cyanosis or clubbing. Cervical Exam:  On physical exam, Justen Yeboah is well-developed, well-nourished, oriented to person, place and time. her gait is normal.  On evaluation of her cervical spine, she has full range of motion of the cervical spine without pain. There is no cervical tenderness to palpation. Shoulder Exam:   On evaluation of her bilaterally upper extremities, her left shoulder has no deformity. There is tenderness upon palpation of the anterolateral shoulder. There is not evidence of scapular dyskinesis. There is not muscle atrophy in shoulder girdle. The range of motion for the Right Shoulder is 140/30/t12 and for the Left shoulder is 100/20/bl. Right shoulder Motor strength is 5/5 in the supraspinatus, 5/5 internal rotation and 5/5 in external rotation, and Left shoulder motor strength 5/5 in supraspinatus, 5/5 in internal rotation, 5/5 in external rotation.         Right shoulder:  negative Impingement , negative Holder ,negative  Speeds,negative  Apprehension ,negative [place] Load Shift, negative Kenn manuver, negative Cross arm test.     Left shoulder:  positive Impingement , positive Dorris Beatty

## 2023-12-12 ENCOUNTER — OFFICE VISIT (OUTPATIENT)
Dept: ORTHOPEDIC SURGERY | Age: 88
End: 2023-12-12

## 2023-12-12 VITALS — WEIGHT: 163 LBS | TEMPERATURE: 98 F | BODY MASS INDEX: 30 KG/M2 | HEIGHT: 62 IN

## 2023-12-12 DIAGNOSIS — M25.812 SHOULDER IMPINGEMENT, LEFT: ICD-10-CM

## 2023-12-12 DIAGNOSIS — S42.215A CLOSED NONDISPLACED FRACTURE OF SURGICAL NECK OF LEFT HUMERUS, UNSPECIFIED FRACTURE MORPHOLOGY, INITIAL ENCOUNTER: Primary | ICD-10-CM

## 2023-12-12 NOTE — PROGRESS NOTES
Other Topics Concern    Not on file   Social History Narrative    Not on file     Social Determinants of Health     Financial Resource Strain: Not on file   Food Insecurity: Not on file   Transportation Needs: Not on file   Physical Activity: Not on file   Stress: Not on file   Social Connections: Not on file   Intimate Partner Violence: Not on file   Housing Stability: Not on file     Family History   Problem Relation Age of Onset    Stroke Mother     Other Father         macular degeneration       REVIEW OF SYSTEMS:     General/Constitution:  (-)weight loss, (-)fever, (-)chills, (-)weakness. Skin: (-) rash,(-) psoriasis,(-) eczema, (-)skin cancer. Musculoskeletal: (-) fractures,  (-) dislocations,(-) collagen vascular disease, (-) fibromyalgia, (-) multiple sclerosis, (-) muscular dystrophy, (-) RSD,(-) joint pain (-)swelling, (-) joint pain,swelling. Neurologic: (-) epilepsy, (-)seizures,(-) brain tumor,(-) TIA, (-)stroke, (-)headaches, (-)Parkinson disease,(-) memory loss, (-) LOC. Cardiovascular: (-) Chest pain, (-) swelling in legs/feet, (-) SOB, (-) cramping in legs/feet with walking. Respiratory: (-) SOB, (-) Coughing, (-) night sweats. GI: (-) nausea, (-) vomiting, (-) diarrhea, (-) blood in stool, (-) gastric ulcer. Psychiatric: (-) Depression, (-) Anxiety, (-) bipolar disease, (-) Alzheimer's Disease  Allergic/Immunologic: (-) allergies latex, (-) allergies metal, (-) skin sensitivity. Hematlogic: (-) anemia, (-) blood transfusion, (-) DVT/PE, (-) Clotting disorders    SUBJECTIVE:    Constitution:  The patient is alert and oriented x 3, appears to be stated age and in no distress. @VS    Skin:  Upon inspection: the skin appears warm, dry and intact. There is not a previous scar over the affected area. There is not any cellulitis, lymphedema or cutaneous lesions noted in the lower extremities. Upon palpation there is no induration noted.       Neurologic:  Gait: normal;  Motor exam of the

## 2023-12-17 RX ORDER — TRIAMCINOLONE ACETONIDE 40 MG/ML
40 INJECTION, SUSPENSION INTRA-ARTICULAR; INTRAMUSCULAR ONCE
Status: COMPLETED | OUTPATIENT
Start: 2023-12-17 | End: 2023-12-17

## 2023-12-17 RX ADMIN — TRIAMCINOLONE ACETONIDE 40 MG: 40 INJECTION, SUSPENSION INTRA-ARTICULAR; INTRAMUSCULAR at 22:09

## 2024-01-09 ENCOUNTER — APPOINTMENT (OUTPATIENT)
Dept: GENERAL RADIOLOGY | Age: 89
End: 2024-01-09
Payer: MEDICARE

## 2024-01-09 ENCOUNTER — HOSPITAL ENCOUNTER (EMERGENCY)
Age: 89
Discharge: HOME | End: 2024-01-09
Payer: MEDICARE

## 2024-01-09 VITALS
HEART RATE: 57 BPM | DIASTOLIC BLOOD PRESSURE: 59 MMHG | BODY MASS INDEX: 31.09 KG/M2 | TEMPERATURE: 98.3 F | OXYGEN SATURATION: 99 % | WEIGHT: 170 LBS | RESPIRATION RATE: 20 BRPM | SYSTOLIC BLOOD PRESSURE: 114 MMHG

## 2024-01-09 DIAGNOSIS — S90.00XA CONTUSION OF ANKLE, UNSPECIFIED LATERALITY, INITIAL ENCOUNTER: Primary | ICD-10-CM

## 2024-01-09 PROCEDURE — 99211 OFF/OP EST MAY X REQ PHY/QHP: CPT

## 2024-01-09 PROCEDURE — 73610 X-RAY EXAM OF ANKLE: CPT

## 2024-01-09 ASSESSMENT — PAIN DESCRIPTION - FREQUENCY: FREQUENCY: CONTINUOUS

## 2024-01-09 ASSESSMENT — PAIN DESCRIPTION - ORIENTATION: ORIENTATION: LEFT

## 2024-01-09 ASSESSMENT — PAIN - FUNCTIONAL ASSESSMENT: PAIN_FUNCTIONAL_ASSESSMENT: 0-10

## 2024-01-09 ASSESSMENT — PAIN DESCRIPTION - ONSET: ONSET: SUDDEN

## 2024-01-09 ASSESSMENT — PAIN SCALES - GENERAL: PAINLEVEL_OUTOF10: 5

## 2024-01-09 ASSESSMENT — PAIN DESCRIPTION - PAIN TYPE: TYPE: ACUTE PAIN

## 2024-01-09 ASSESSMENT — PAIN DESCRIPTION - LOCATION: LOCATION: ANKLE

## 2024-01-09 ASSESSMENT — PAIN DESCRIPTION - DESCRIPTORS: DESCRIPTORS: SORE

## 2024-01-09 NOTE — ED PROVIDER NOTES
history includes Other in her father; Stroke in her mother.  Allergies: Alendronate sodium, Celebrex [celecoxib], Ciloxan [ciprofloxacin], Polymyxin b-hc [otobiotic], Statins [statins], Sulfa antibiotics, Codeine, Fluconazole, Levofloxacin, and Vicodin [hydrocodone-acetaminophen]    Physical Exam   Oxygen Saturation Interpretation: Normal.   ED Triage Vitals [01/09/24 1236]   BP Temp Temp Source Pulse Respirations SpO2 Height Weight - Scale   (!) 114/59 98.3 °F (36.8 °C) Infrared 57 20 99 % -- 77.1 kg (170 lb)       Physical Exam  Constitutional/General: Alert and oriented x3, well appearing, non toxic in NAD  HEENT:  NC/NT.  Neck: Supple, full ROM,   Respiratory: Lungs clear to auscultation bilaterally, no wheezes, rales, or rhonchi. Not in respiratory distress  CV:  Regular rate. Regular rhythm.  Musculoskeletal: She does have ecchymosis on the medial aspect of the left ankle she also has some edema.  There is no erythema or abnormal warmth she has a palpable pedal pulse normal color warmth and sensation present to the toes   integument: skin warm and dry. No rashes.   Lymphatic: no lymphadenopathy noted  Neurologic: GCS 15, no focal deficits, symmetric strength 5/5 in the upper and lower extremities bilaterally  Psychiatric: Normal Affect    Lab / Imaging Results   (All laboratory and radiology results have been personally reviewed by myself)  Labs:  No results found for this visit on 01/09/24.  Imaging:  All Radiology results interpreted by Radiologist unless otherwise noted.  XR ANKLE LEFT (MIN 3 VIEWS)   Final Result   No acute osseous abnormality involving the left ankle.             ED Course / Medical Decision Making   Medications - No data to display       Consult(s):   None    MDM:   She has pain and bruising on the medial aspect of the ankle where she hit it on the walker it has been going on for 6 days so she came in for evaluation.  I did obtain an x-ray and it was negative for fracture.  I discussed

## 2024-03-12 ENCOUNTER — OFFICE VISIT (OUTPATIENT)
Dept: ORTHOPEDIC SURGERY | Age: 89
End: 2024-03-12

## 2024-03-12 VITALS — WEIGHT: 170 LBS | BODY MASS INDEX: 31.28 KG/M2 | HEIGHT: 62 IN | TEMPERATURE: 98 F

## 2024-03-12 DIAGNOSIS — M25.812 SHOULDER IMPINGEMENT, LEFT: ICD-10-CM

## 2024-03-12 DIAGNOSIS — S42.215A CLOSED NONDISPLACED FRACTURE OF SURGICAL NECK OF LEFT HUMERUS, UNSPECIFIED FRACTURE MORPHOLOGY, INITIAL ENCOUNTER: Primary | ICD-10-CM

## 2024-03-12 RX ORDER — TRIAMCINOLONE ACETONIDE 40 MG/ML
40 INJECTION, SUSPENSION INTRA-ARTICULAR; INTRAMUSCULAR ONCE
Status: COMPLETED | OUTPATIENT
Start: 2024-03-12 | End: 2024-03-12

## 2024-03-12 RX ADMIN — TRIAMCINOLONE ACETONIDE 40 MG: 40 INJECTION, SUSPENSION INTRA-ARTICULAR; INTRAMUSCULAR at 15:57

## 2024-03-12 NOTE — PROGRESS NOTES
shoulder:  positive Impingement , positive Holder ,negative  Speeds,negative  Apprehension ,negative [place] Load Shift, negative Kenn manuver, negative Cross arm test.     X-ray:  Stable near completely healed fracture seen of the humeral head.  No  significant change in the alignment    MRI:  None    Radiographic findings reviewed with patient        Impression:       Encounter Diagnoses   Name Primary?    Closed nondisplaced fracture of surgical neck of left humerus, unspecified fracture morphology, initial encounter Yes    Shoulder impingement, left            Plan:  Natural history and expected course discussed. Questions answered.  Educational material distributed.  Reduction in offending activity.  Gentle ROM exercises  RICE therapy.  NSAIDs per medication orders.  OTC analgesics as needed.    I had a lengthy discussion with the patient regarding their diagnosis. I explained treatment options including surgical vs non surgical treatment. I reviewed in detail the risks and benefits and outlined the procedure in detail with expected outcomes and possible complications.  I also discussed non surgical treatment such as injections (CSI and visco supplementation), physical therapy, topical creams and NSAID's. They have elected for conservative management at this time.        I will proceed with a cortisone injection in the Right shoulder. Verbal and written consent was obtained for the injection.  Skin was prepped with alcohol, 1 ml of Kenalog 40 mg and 9 ml of 0.25% Marcaine was injected to the posterior shoulder through the subacromial space of the Right Shoulder. The patient tolerated the injections well. I will see the patient back prn.

## 2024-06-05 ENCOUNTER — HOSPITAL ENCOUNTER (OUTPATIENT)
Age: 89
Discharge: HOME | End: 2024-06-05
Payer: MEDICARE

## 2024-06-05 ENCOUNTER — HOSPITAL ENCOUNTER (OUTPATIENT)
Dept: ULTRASOUND IMAGING | Age: 89
Discharge: HOME | End: 2024-06-05
Payer: MEDICARE

## 2024-06-05 ENCOUNTER — HOSPITAL ENCOUNTER (OUTPATIENT)
Dept: GENERAL RADIOLOGY | Age: 89
Discharge: HOME | End: 2024-06-07

## 2024-06-05 ENCOUNTER — HOSPITAL ENCOUNTER (OUTPATIENT)
Dept: GENERAL RADIOLOGY | Age: 89
Discharge: HOME | End: 2024-06-07
Payer: MEDICARE

## 2024-06-05 ENCOUNTER — HOSPITAL ENCOUNTER (OUTPATIENT)
Age: 89
Discharge: HOME | End: 2024-06-07
Payer: MEDICARE

## 2024-06-05 DIAGNOSIS — R05.9 COUGH, UNSPECIFIED TYPE: ICD-10-CM

## 2024-06-05 DIAGNOSIS — M79.605 PAIN IN LEFT LEG: ICD-10-CM

## 2024-06-05 DIAGNOSIS — M25.552 LEFT HIP PAIN: ICD-10-CM

## 2024-06-05 PROCEDURE — 71046 X-RAY EXAM CHEST 2 VIEWS: CPT

## 2024-06-05 PROCEDURE — 93971 EXTREMITY STUDY: CPT

## 2024-06-05 PROCEDURE — 73521 X-RAY EXAM HIPS BI 2 VIEWS: CPT

## 2024-06-17 ENCOUNTER — OFFICE VISIT (OUTPATIENT)
Dept: ORTHOPEDIC SURGERY | Age: 89
End: 2024-06-17
Payer: MEDICARE

## 2024-06-17 VITALS — WEIGHT: 170 LBS | HEIGHT: 62 IN | BODY MASS INDEX: 31.28 KG/M2 | TEMPERATURE: 98 F

## 2024-06-17 DIAGNOSIS — M16.11 PRIMARY OSTEOARTHRITIS OF RIGHT HIP: Primary | ICD-10-CM

## 2024-06-17 DIAGNOSIS — M16.11 ARTHRITIS OF RIGHT HIP: Primary | ICD-10-CM

## 2024-06-17 PROCEDURE — G8417 CALC BMI ABV UP PARAM F/U: HCPCS | Performed by: ORTHOPAEDIC SURGERY

## 2024-06-17 PROCEDURE — 1036F TOBACCO NON-USER: CPT | Performed by: ORTHOPAEDIC SURGERY

## 2024-06-17 PROCEDURE — G8427 DOCREV CUR MEDS BY ELIG CLIN: HCPCS | Performed by: ORTHOPAEDIC SURGERY

## 2024-06-17 PROCEDURE — 1123F ACP DISCUSS/DSCN MKR DOCD: CPT | Performed by: ORTHOPAEDIC SURGERY

## 2024-06-17 PROCEDURE — 1090F PRES/ABSN URINE INCON ASSESS: CPT | Performed by: ORTHOPAEDIC SURGERY

## 2024-06-17 PROCEDURE — 99213 OFFICE O/P EST LOW 20 MIN: CPT | Performed by: ORTHOPAEDIC SURGERY

## 2024-06-17 NOTE — PROGRESS NOTES
Ivon Ghotra is a 88 y.o. female, who presents   Chief Complaint   Patient presents with    Hip Pain     B/l hip pain on and off for years. Pain in the groin and thigh.       HPI:: Denies head nerve problems over the past years or more.  She had a fall in March 2023 resulting in a comminuted fracture of the left humeral neck and head.  She has had some decreased motion in the shoulder and has some pain in that.  She was in physical therapy for some time to try to rehab that.  Surgery was apparently not an option.  She was treated by another physician in the practice.  She has had problems with the right hip with discomfort in the groin and slight decrease in range of motion.  She was sen by Dr. Amin for x-rays of the hips and pelvis 6/5/2024.  This showed arthritis in the right hip and significant scoliosis in the lumbar and thoracolumbar area.  t    Allergies; medications; past medical, surgical, family, and social history; and problem list have been reviewed today and updated as indicated in this encounter - see below following Ortho specifics.    Musculoskeletal: Skin condition gross neurovascular functions good in the lower extremities.  The right knee has good range of motion with good stability and no pain with range of motion.  Hip range of motion was compared left and right and she has decreased internal rotation of the right hip and some discomfort associated with this.  She has good abduction and flexion yet.  Her gait is slow and fairly well-balanced.    Radiologic Studies: Imaging of the pelvis and right hip shows the right joint space to be narrower than that on the left.  The contours of femoral head and acetabulum are good with no collapse or erosion respectively.    There is significant thoracolumbar scoliosis.  Detail was not good on these films.    ASSESSMENT:  Ivon was seen today for hip pain.    Diagnoses and all orders for this visit:    Arthritis of right hip     Treatment alternatives  For information on Fall & Injury Prevention, visit: [email or web address] OR  [email or web address] OR  https://www.cdc.gov/steadi/patient.html

## 2024-07-03 ENCOUNTER — HOSPITAL ENCOUNTER (OUTPATIENT)
Dept: INTERVENTIONAL RADIOLOGY/VASCULAR | Age: 89
Discharge: HOME | End: 2024-07-05
Payer: MEDICARE

## 2024-07-03 DIAGNOSIS — M16.11 PRIMARY OSTEOARTHRITIS OF RIGHT HIP: ICD-10-CM

## 2024-07-03 PROCEDURE — 6360000004 HC RX CONTRAST MEDICATION: Performed by: RADIOLOGY

## 2024-07-03 PROCEDURE — 6360000002 HC RX W HCPCS: Performed by: RADIOLOGY

## 2024-07-03 PROCEDURE — 20610 DRAIN/INJ JOINT/BURSA W/O US: CPT

## 2024-07-03 PROCEDURE — 77002 NEEDLE LOCALIZATION BY XRAY: CPT

## 2024-07-03 RX ORDER — IOPAMIDOL 612 MG/ML
3 INJECTION, SOLUTION INTRATHECAL
Status: COMPLETED | OUTPATIENT
Start: 2024-07-03 | End: 2024-07-03

## 2024-07-03 RX ORDER — BUPIVACAINE HYDROCHLORIDE 2.5 MG/ML
2 INJECTION, SOLUTION EPIDURAL; INFILTRATION; INTRACAUDAL ONCE
Status: COMPLETED | OUTPATIENT
Start: 2024-07-03 | End: 2024-07-03

## 2024-07-03 RX ORDER — TRIAMCINOLONE ACETONIDE 40 MG/ML
40 INJECTION, SUSPENSION INTRA-ARTICULAR; INTRAMUSCULAR ONCE
Status: COMPLETED | OUTPATIENT
Start: 2024-07-03 | End: 2024-07-03

## 2024-07-03 RX ADMIN — TRIAMCINOLONE ACETONIDE 40 MG: 40 INJECTION, SUSPENSION INTRA-ARTICULAR; INTRAMUSCULAR at 15:56

## 2024-07-03 RX ADMIN — BUPIVACAINE HYDROCHLORIDE 5 MG: 2.5 INJECTION, SOLUTION EPIDURAL; INFILTRATION; INTRACAUDAL; PERINEURAL at 15:56

## 2024-07-03 RX ADMIN — IOPAMIDOL 3 ML: 612 INJECTION, SOLUTION INTRATHECAL at 15:56

## 2024-07-03 ASSESSMENT — PAIN DESCRIPTION - LOCATION: LOCATION: HIP

## 2024-07-03 ASSESSMENT — PAIN SCALES - GENERAL: PAINLEVEL_OUTOF10: 6

## 2024-12-10 PROBLEM — L25.9 CONTACT DERMATITIS: Status: ACTIVE | Noted: 2024-12-10

## 2024-12-10 PROBLEM — D23.5 BENIGN NEOPLASM OF SKIN OF TRUNK, EXCEPT SCROTUM: Status: ACTIVE | Noted: 2024-12-10

## 2024-12-10 PROBLEM — T30.0 BURN INJURY: Status: ACTIVE | Noted: 2024-12-10

## 2024-12-10 PROBLEM — L82.0 INFLAMED SEBORRHEIC KERATOSIS: Status: ACTIVE | Noted: 2024-12-10

## 2024-12-10 RX ORDER — HYDROCODONE BITARTRATE AND ACETAMINOPHEN 5; 325 MG/1; MG/1
1 TABLET ORAL EVERY 8 HOURS PRN
COMMUNITY

## 2024-12-10 RX ORDER — DESONIDE 0.5 MG/G
0.05 CREAM TOPICAL 2 TIMES DAILY
COMMUNITY

## 2024-12-10 RX ORDER — HYDROCORTISONE 25 MG/G
2.5 CREAM TOPICAL 2 TIMES DAILY
COMMUNITY

## 2024-12-10 RX ORDER — KETOCONAZOLE 20 MG/G
2 CREAM TOPICAL DAILY
COMMUNITY

## 2024-12-10 RX ORDER — TRAMADOL HYDROCHLORIDE 50 MG/1
50 TABLET ORAL EVERY 6 HOURS PRN
COMMUNITY

## 2024-12-10 RX ORDER — CEFDINIR 300 MG/1
300 CAPSULE ORAL DAILY
COMMUNITY

## 2024-12-10 RX ORDER — CICLOPIROX OLAMINE 7.7 MG/G
0.77 CREAM TOPICAL 2 TIMES DAILY
COMMUNITY

## 2024-12-11 ENCOUNTER — OFFICE VISIT (OUTPATIENT)
Dept: PULMONOLOGY | Age: 89
End: 2024-12-11
Payer: MEDICARE

## 2024-12-11 VITALS
WEIGHT: 168 LBS | OXYGEN SATURATION: 98 % | BODY MASS INDEX: 31.72 KG/M2 | HEIGHT: 61 IN | HEART RATE: 56 BPM | DIASTOLIC BLOOD PRESSURE: 60 MMHG | SYSTOLIC BLOOD PRESSURE: 120 MMHG | TEMPERATURE: 98.2 F

## 2024-12-11 DIAGNOSIS — R05.9 COUGH, UNSPECIFIED TYPE: ICD-10-CM

## 2024-12-11 DIAGNOSIS — R91.1 LUNG NODULE: Primary | ICD-10-CM

## 2024-12-11 DIAGNOSIS — J45.909 UNCOMPLICATED ASTHMA, UNSPECIFIED ASTHMA SEVERITY, UNSPECIFIED WHETHER PERSISTENT: ICD-10-CM

## 2024-12-11 LAB
BASOPHILS ABSOLUTE: 0.03 K/UL (ref 0–0.2)
BASOPHILS RELATIVE PERCENT: 0 % (ref 0–2)
EOSINOPHILS ABSOLUTE: 0.22 K/UL (ref 0.05–0.5)
EOSINOPHILS RELATIVE PERCENT: 3 % (ref 0–6)
HCT VFR BLD CALC: 40 % (ref 34–48)
HEMOGLOBIN: 13 G/DL (ref 11.5–15.5)
IMMATURE GRANULOCYTES %: 0 % (ref 0–5)
IMMATURE GRANULOCYTES ABSOLUTE: <0.03 K/UL (ref 0–0.58)
LYMPHOCYTES ABSOLUTE: 1.78 K/UL (ref 1.5–4)
LYMPHOCYTES RELATIVE PERCENT: 23 % (ref 20–42)
MCH RBC QN AUTO: 30.2 PG (ref 26–35)
MCHC RBC AUTO-ENTMCNC: 32.5 G/DL (ref 32–34.5)
MCV RBC AUTO: 92.8 FL (ref 80–99.9)
MONOCYTES ABSOLUTE: 0.78 K/UL (ref 0.1–0.95)
MONOCYTES RELATIVE PERCENT: 10 % (ref 2–12)
NEUTROPHILS ABSOLUTE: 4.89 K/UL (ref 1.8–7.3)
NEUTROPHILS RELATIVE PERCENT: 63 % (ref 43–80)
PDW BLD-RTO: 13.1 % (ref 11.5–15)
PLATELET # BLD: 190 K/UL (ref 130–450)
PMV BLD AUTO: 12.3 FL (ref 7–12)
RBC # BLD: 4.31 M/UL (ref 3.5–5.5)
WBC # BLD: 7.7 K/UL (ref 4.5–11.5)

## 2024-12-11 PROCEDURE — 1123F ACP DISCUSS/DSCN MKR DOCD: CPT | Performed by: INTERNAL MEDICINE

## 2024-12-11 PROCEDURE — 99204 OFFICE O/P NEW MOD 45 MIN: CPT | Performed by: INTERNAL MEDICINE

## 2024-12-11 PROCEDURE — 1090F PRES/ABSN URINE INCON ASSESS: CPT | Performed by: INTERNAL MEDICINE

## 2024-12-11 PROCEDURE — G8484 FLU IMMUNIZE NO ADMIN: HCPCS | Performed by: INTERNAL MEDICINE

## 2024-12-11 PROCEDURE — 1036F TOBACCO NON-USER: CPT | Performed by: INTERNAL MEDICINE

## 2024-12-11 PROCEDURE — G8417 CALC BMI ABV UP PARAM F/U: HCPCS | Performed by: INTERNAL MEDICINE

## 2024-12-11 PROCEDURE — 1159F MED LIST DOCD IN RCRD: CPT | Performed by: INTERNAL MEDICINE

## 2024-12-11 PROCEDURE — G8427 DOCREV CUR MEDS BY ELIG CLIN: HCPCS | Performed by: INTERNAL MEDICINE

## 2024-12-11 ASSESSMENT — ENCOUNTER SYMPTOMS
COUGH: 1
ALLERGIC/IMMUNOLOGIC NEGATIVE: 1
EYES NEGATIVE: 1

## 2024-12-11 NOTE — PROGRESS NOTES
Patient to follow up with physician in 4 weeks. Orders for PFT and Chest CT will be sent to [facility].

## 2024-12-11 NOTE — PROGRESS NOTES
Progress Note    Ivon Ghotra  1935    Cc:Abnormal CT       HPI : 89 year old female has chronic cough, had chest CT in Nov. 2024, showed 5 mm left lung nodule . No sob or wheezing, never smoked. No prior history of asthma. History of hiatal hernia and GERD and is on PPI.     Past Medical History:   Diagnosis Date    Anxiety     Anxiety     Arthritis     Bronchitis, chronic (HCC)     CAD (coronary artery disease)     Dry eye syndrome     GERD (gastroesophageal reflux disease)     Hiatal hernia     Hyperlipidemia     Hypertension     Macular degeneration     lily    Reflux     Thyroid disease     goiter\" watching it- Dr. Persaud\"      Past Surgical History:   Procedure Laterality Date    CARDIAC SURGERY      heart cath 11/29/10 with stent placed    COLONOSCOPY  06/03/2016    DILATION AND CURETTAGE OF UTERUS      INTRACAPSULAR CATARACT EXTRACTION Right 06/16/2021    CATARACT EXTRACTION WITH IOL IMPLANT-RIGHT EYE performed by Grzegorz Zamora DO at [facility] OR    INTRACAPSULAR CATARACT EXTRACTION Left 6/30/2021    CATARACT EXTRACTION WITH IOL IMPLANT-LEFT EYE performed by Grzegorz Zamora DO at [facility] OR    VASCULAR SURGERY  11/29/2010    1 - stent:  Dr. hardin- 1/21      Family History   Problem Relation Age of Onset    Stroke Mother     Other Father         macular degeneration      Social History     Socioeconomic History    Marital status:    Tobacco Use    Smoking status: Never    Smokeless tobacco: Never   Vaping Use    Vaping status: Never Used   Substance and Sexual Activity    Alcohol use: No     Comment: rare    Drug use: No    Sexual activity: Never        Alendronate sodium, Celebrex [celecoxib], Ciloxan [ciprofloxacin], Polymyxin b-hc [otobiotic], Statins [statins], Sulfa antibiotics, Acetaminophen, Codeine, Fluconazole, Hydrocodone, Levofloxacin, and Vicodin [hydrocodone-acetaminophen]     Current Outpatient Medications:     ciclopirox (LOPROX) 0.77 % cream, Apply 0.77 %

## 2024-12-13 LAB
ALLERGEN BERMUDA GRASS IGE: <0.1 KU/L (ref 0–0.34)
ALLERGEN BIRCH IGE: <0.1 KU/L (ref 0–0.34)
ALLERGEN DOG DANDER IGE: <0.1 KU/L (ref 0–0.34)
ALLERGEN GERMAN COCKROACH IGE: <0.1 KU/L (ref 0–0.34)
ALLERGEN HORMODENDRUM IGE: <0.1 KUL/L (ref 0–0.34)
ALLERGEN MOUSE EPITHELIA IGE: <0.1 KU/L (ref 0–0.34)
ALLERGEN OAK TREE IGE: <0.1 KU/L (ref 0–0.34)
ALLERGEN PECAN TREE IGE: <0.1 KU/L (ref 0–0.34)
ALLERGEN PIGWEED ROUGH IGE: <0.1 KU/L (ref 0–0.34)
ALLERGEN SHEEP SORREL (W18) IGE: <0.1 KU/L (ref 0–0.34)
ALLERGEN TREE SYCAMORE: <0.1 KU/L (ref 0–0.34)
ALLERGEN WALNUT TREE IGE: <0.1 KU/L (ref 0–0.34)
ALLERGEN WHITE MULBERRY TREE, IGE: <0.1 KU/L (ref 0–0.34)
ALLERGEN, TREE, WHITE ASH IGE: <0.1 KU/L (ref 0–0.34)
ALTERNARIA ALTERNATA: <0.1 KU/L (ref 0–0.34)
ASPERGILLUS FUMIGATUS: <0.1 KU/L (ref 0–0.34)
CAT DANDER ANTIBODY: <0.1 KU/L (ref 0–0.34)
COTTONWOOD TREE: <0.1 KU/L (ref 0–0.34)
D. FARINAE: <0.1 KU/L (ref 0–0.34)
D. PTERONYSSINUS: <0.1 KU/L (ref 0–0.34)
ELM TREE: <0.1 KU/L (ref 0–0.34)
IGE: 6 IU/ML (ref 0–100)
MAPLE/BOXELDER TREE: <0.1 KU/L (ref 0–0.34)
MOUNTAIN CEDAR TREE: <0.1 KU/L (ref 0–0.34)
MUCOR RACEMOSUS: <0.1 KU/L (ref 0–0.34)
P. NOTATUM: <0.1 KU/L (ref 0–0.34)
RUSSIAN THISTLE: <0.1 KU/L (ref 0–0.34)
SHORT RAGWD(A ARTEMIS.) IGE: <0.1 KU/L (ref 0–0.34)
TIMOTHY GRASS: <0.1 KU/L (ref 0–0.34)

## 2025-01-13 ENCOUNTER — HOSPITAL ENCOUNTER (OUTPATIENT)
Dept: PULMONOLOGY | Age: 89
Discharge: HOME | End: 2025-01-13
Attending: INTERNAL MEDICINE
Payer: MEDICARE

## 2025-01-13 DIAGNOSIS — R05.9 COUGH, UNSPECIFIED TYPE: ICD-10-CM

## 2025-01-13 DIAGNOSIS — J45.909 UNCOMPLICATED ASTHMA, UNSPECIFIED ASTHMA SEVERITY, UNSPECIFIED WHETHER PERSISTENT: ICD-10-CM

## 2025-01-13 PROCEDURE — 94729 DIFFUSING CAPACITY: CPT

## 2025-01-13 PROCEDURE — 94060 EVALUATION OF WHEEZING: CPT

## 2025-01-13 PROCEDURE — 94726 PLETHYSMOGRAPHY LUNG VOLUMES: CPT

## 2025-01-14 NOTE — PROCEDURES
[address] [address]                           PULMONARY FUNCTION      PATIENT NAME: CELY STEWART                  : 1935  MED REC NO: [id number]                        ROOM:   ACCOUNT NO: [id number]                       ADMIT DATE: 2025  PROVIDER: Dhara Yoder MD      DATE OF PROCEDURE: 2025    SURGEON:  Dhara Yoder MD    REFERRING PHYSICIAN:  DHARA YODER    The spirometry shows normal FVC, FEV1 and FEV1/FVC.  The maximum voluntary ventilation is 76% of the predicted value.    According to Z-score criteria, FVC, FEV1 and FEV1/FVC are under the area of curve, within standard deviation of -1.64.  After bronchodilator therapy, there is improvement seen in the spirometry.    The lung volume study shows increased TLC and normal RV.    The diffusion capacity is minimally decreased.    IMPRESSION:  The above study is normal except minimal decrease in diffusion capacity and there is improvement after bronchodilator therapy.          DHARA YODER MD      D:  2025 14:29:17     T:  2025 20:55:28     TIMBO/BETINA  Job #:  [id number]     Doc#:  [id number]

## 2025-02-07 PROBLEM — H90.3 SENSORINEURAL HEARING LOSS (SNHL) OF BOTH EARS: Status: ACTIVE | Noted: 2025-02-07

## 2025-02-07 RX ORDER — NYSTATIN 100000 U/G
1 CREAM TOPICAL PRN
COMMUNITY

## 2025-02-07 RX ORDER — BENZONATATE 100 MG/1
100 CAPSULE ORAL 2 TIMES DAILY PRN
COMMUNITY

## 2025-02-07 RX ORDER — MELOXICAM 7.5 MG/1
1 TABLET ORAL DAILY
COMMUNITY

## 2025-02-10 ENCOUNTER — OFFICE VISIT (OUTPATIENT)
Dept: PULMONOLOGY | Age: 89
End: 2025-02-10
Payer: MEDICARE

## 2025-02-10 VITALS
DIASTOLIC BLOOD PRESSURE: 76 MMHG | TEMPERATURE: 98.2 F | HEIGHT: 61 IN | BODY MASS INDEX: 31.15 KG/M2 | SYSTOLIC BLOOD PRESSURE: 120 MMHG | OXYGEN SATURATION: 98 % | HEART RATE: 67 BPM | WEIGHT: 165 LBS

## 2025-02-10 DIAGNOSIS — R05.3 CHRONIC COUGH: ICD-10-CM

## 2025-02-10 DIAGNOSIS — R91.8 MULTIPLE LUNG NODULES: Primary | ICD-10-CM

## 2025-02-10 PROCEDURE — G8417 CALC BMI ABV UP PARAM F/U: HCPCS | Performed by: INTERNAL MEDICINE

## 2025-02-10 PROCEDURE — 1123F ACP DISCUSS/DSCN MKR DOCD: CPT | Performed by: INTERNAL MEDICINE

## 2025-02-10 PROCEDURE — 1159F MED LIST DOCD IN RCRD: CPT | Performed by: INTERNAL MEDICINE

## 2025-02-10 PROCEDURE — 99214 OFFICE O/P EST MOD 30 MIN: CPT | Performed by: INTERNAL MEDICINE

## 2025-02-10 PROCEDURE — 1090F PRES/ABSN URINE INCON ASSESS: CPT | Performed by: INTERNAL MEDICINE

## 2025-02-10 PROCEDURE — 1036F TOBACCO NON-USER: CPT | Performed by: INTERNAL MEDICINE

## 2025-02-10 PROCEDURE — G8427 DOCREV CUR MEDS BY ELIG CLIN: HCPCS | Performed by: INTERNAL MEDICINE

## 2025-02-10 NOTE — PROGRESS NOTES
Progress Note    Ivon Ghotra  1935    CC:Pulmonary Nodule       HPI : 89 year old female with chronic cough, no sob or wheezing. She had chest [facility] at [facility], showed multiple lung nodules. She never smoked. Her PFT is unremarkable except minimal decrease in DLCO and there was improvement after bronchodilator. Respiratory allergen panel showed no allergy.     Past Medical History:   Diagnosis Date    Anxiety     Anxiety     Arthritis     Bronchitis, chronic (HCC)     CAD (coronary artery disease)     Dry eye syndrome     GERD (gastroesophageal reflux disease)     Hiatal hernia     Hyperlipidemia     Hypertension     Macular degeneration     lily    Reflux     Thyroid disease     goiter\" watching it- Dr. Persaud\"      Past Surgical History:   Procedure Laterality Date    CARDIAC SURGERY      heart cath 11/29/10 with stent placed    COLONOSCOPY  06/03/2016    DILATION AND CURETTAGE OF UTERUS      INTRACAPSULAR CATARACT EXTRACTION Right 06/16/2021    CATARACT EXTRACTION WITH IOL IMPLANT-RIGHT EYE performed by Grzegorz Zamora DO at [facility] OR    INTRACAPSULAR CATARACT EXTRACTION Left 6/30/2021    CATARACT EXTRACTION WITH IOL IMPLANT-LEFT EYE performed by Grzegorz Zamora DO at [facility] OR    VASCULAR SURGERY  11/29/2010    1 - stent:  Dr. hardin- 1/21      Family History   Problem Relation Age of Onset    Stroke Mother     Other Father         macular degeneration      Social History     Socioeconomic History    Marital status:      Spouse name: None    Number of children: None    Years of education: None    Highest education level: None   Tobacco Use    Smoking status: Never    Smokeless tobacco: Never   Vaping Use    Vaping status: Never Used   Substance and Sexual Activity    Alcohol use: No     Comment: rare    Drug use: No    Sexual activity: Never        Alendronate sodium, Celebrex [celecoxib], Ciloxan [ciprofloxacin], Polymyxin b-hc [otobiotic], Statins [statins], Sulfa

## 2025-02-10 NOTE — PATIENT INSTRUCTIONS
As of 3/31/25, [facility] will be welcoming patients at our new location.    [facility]  [address] [place] [address] [address], [address]  [place] [id number]  Ph:  [phone]  Fax:  [phone]

## 2025-02-12 PROBLEM — R05.9 COUGH: Status: RESOLVED | Noted: 2025-01-13 | Resolved: 2025-02-12

## 2025-05-13 ENCOUNTER — APPOINTMENT (OUTPATIENT)
Dept: GENERAL RADIOLOGY | Age: 89
End: 2025-05-13
Payer: MEDICARE

## 2025-05-13 ENCOUNTER — APPOINTMENT (OUTPATIENT)
Dept: CT IMAGING | Age: 89
End: 2025-05-13
Payer: MEDICARE

## 2025-05-13 ENCOUNTER — HOSPITAL ENCOUNTER (EMERGENCY)
Age: 89
Discharge: HOME | End: 2025-05-13
Payer: MEDICARE

## 2025-05-13 VITALS
OXYGEN SATURATION: 98 % | TEMPERATURE: 97.6 F | HEART RATE: 60 BPM | DIASTOLIC BLOOD PRESSURE: 80 MMHG | SYSTOLIC BLOOD PRESSURE: 154 MMHG | RESPIRATION RATE: 18 BRPM

## 2025-05-13 DIAGNOSIS — S70.02XA CONTUSION OF LEFT HIP, INITIAL ENCOUNTER: ICD-10-CM

## 2025-05-13 DIAGNOSIS — E04.2 MULTIPLE THYROID NODULES: ICD-10-CM
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
## 2025-05-13 DIAGNOSIS — S09.90XA CLOSED HEAD INJURY, INITIAL ENCOUNTER: ICD-10-CM

## 2025-05-13 PROCEDURE — 99284 EMERGENCY DEPT VISIT MOD MDM: CPT

## 2025-05-13 PROCEDURE — 6370000000 HC RX 637 (ALT 250 FOR IP): Performed by: NURSE PRACTITIONER

## 2025-05-13 PROCEDURE — 70450 CT HEAD/BRAIN W/O DYE: CPT

## 2025-05-13 PROCEDURE — 72125 CT NECK SPINE W/O DYE: CPT

## 2025-05-13 PROCEDURE — 73502 X-RAY EXAM HIP UNI 2-3 VIEWS: CPT

## 2025-05-13 RX ORDER — ACETAMINOPHEN 325 MG/1
650 TABLET ORAL ONCE
Status: COMPLETED | OUTPATIENT
Start: 2025-05-13 | End: 2025-05-13

## 2025-05-13 RX ADMIN — ACETAMINOPHEN 650 MG: 325 TABLET ORAL at 20:55

## 2025-05-13 ASSESSMENT — LIFESTYLE VARIABLES
HOW MANY STANDARD DRINKS CONTAINING ALCOHOL DO YOU HAVE ON A TYPICAL DAY: PATIENT DOES NOT DRINK
HOW OFTEN DO YOU HAVE A DRINK CONTAINING ALCOHOL: NEVER

## 2025-05-13 ASSESSMENT — PAIN DESCRIPTION - ORIENTATION: ORIENTATION: RIGHT;LEFT

## 2025-05-13 ASSESSMENT — PAIN DESCRIPTION - LOCATION: LOCATION: HEAD;NECK

## 2025-05-13 ASSESSMENT — PAIN SCALES - GENERAL: PAINLEVEL_OUTOF10: 8

## 2025-05-13 NOTE — ED PROVIDER NOTES
Independent LIZ Visit.     [facility] EMERGENCY DEPARTMENT  EMERGENCY DEPARTMENT ENCOUNTER      Pt Name: Ivon Ghotra  MRN: [id number]  Birthdate 1935  Date of evaluation: 5/13/2025  Provider: JACKELYN Renae - JULIA  PCP: Gerson Amin MD  Note Started: 7:20 PM EDT 5/13/25    CHIEF COMPLAINT       Chief Complaint   Patient presents with    Fall     Fell on Saturday on her patio. Hit head on a vinyl fence. Complaining of had, neck, and left hip pain. Patient is ambulatory. -LOC, -thinners.        HISTORY OF PRESENT ILLNESS: 1 or more Elements   History From: Patient  Limitations to history : None    Ivon Ghotra is a 89 y.o. female who has a past medical history of anxiety, arthritis, GERD, hyperlipidemia, hypertension, macular degeneration presents to the emergency department with pain to the top of her head, neck and left vents following mechanical fall 4 days ago.  Patient reports that she was taking a small patio table out to her patio, bent down to place the table on the ground whenever she did she \"kept going\" and fell striking her head on a vinyl fence and then fell striking her left hip on the ground.  Has had pain since that time.  She denies any prodromal symptoms prior to the fall.  States she has been taking Tylenol at home but her pain has persisted.  She denies any use of blood thinning agents, has had no vomiting, had no loss of consciousness.  Denies any previous injury to the neck of the hip.    Nursing Notes were all reviewed and agreed with or any disagreements were addressed in the HPI.    REVIEW OF SYSTEMS :    Positives and Pertinent negatives as per HPI.     PAST MEDICAL HISTORY/Chronic Conditions Affecting Care    has a past medical history of Anxiety, Anxiety, Arthritis, Bronchitis, chronic (HCC), CAD (coronary artery disease), Dry eye syndrome, GERD (gastroesophageal reflux disease), Hiatal hernia, Hyperlipidemia, Hypertension, Macular degeneration, Reflux,

## 2025-05-14 NOTE — DISCHARGE INSTRUCTIONS
XR HIP 2-3 VW W PELVIS LEFT   Final Result      1.  Scoliosis and degenerative change of the lumbar spine.      2.  Osteoarthritic change of the right hip but no significant arthritic   change of the left hip.  No evidence of acute fracture or traumatic   malalignment.         CT HEAD WO CONTRAST   Final Result   No acute intracranial abnormality.         CT CERVICAL SPINE WO CONTRAST   Final Result   1. No acute fracture or traumatic malalignment of the cervical spine.   2. Moderate to severe multilevel degenerative changes.   3. Bilateral thyroid nodules with coarse calcifications involving the right   lobe.  Correlation with thyroid ultrasound on a nonemergent basis.            The CAT scan of your head and neck was normal.  There was an incidental finding of a thyroid nodule, you will need to follow-up with your regular doctor for this.  No evidence of fracture of your hip.  You may continue Tylenol as needed, ice to any painful areas.  You will likely be sore for several days.

## 2025-07-30 ENCOUNTER — OFFICE VISIT (OUTPATIENT)
Dept: ENT CLINIC | Age: 89
End: 2025-07-30
Payer: MEDICARE

## 2025-07-30 VITALS
HEART RATE: 56 BPM | HEIGHT: 61 IN | SYSTOLIC BLOOD PRESSURE: 134 MMHG | DIASTOLIC BLOOD PRESSURE: 77 MMHG | WEIGHT: 170 LBS | OXYGEN SATURATION: 95 % | RESPIRATION RATE: 16 BRPM | BODY MASS INDEX: 32.1 KG/M2

## 2025-07-30 DIAGNOSIS — E04.1 THYROID NODULE: ICD-10-CM

## 2025-07-30 DIAGNOSIS — E04.1 THYROID NODULE: Primary | ICD-10-CM

## 2025-07-30 LAB
THYROXINE (T4): 7.2 UG/DL (ref 4.5–11.7)
TSH SERPL DL<=0.05 MIU/L-ACNC: 1.6 UIU/ML (ref 0.27–4.2)

## 2025-07-30 PROCEDURE — 1090F PRES/ABSN URINE INCON ASSESS: CPT | Performed by: OTOLARYNGOLOGY

## 2025-07-30 PROCEDURE — 1036F TOBACCO NON-USER: CPT | Performed by: OTOLARYNGOLOGY

## 2025-07-30 PROCEDURE — G8417 CALC BMI ABV UP PARAM F/U: HCPCS | Performed by: OTOLARYNGOLOGY

## 2025-07-30 PROCEDURE — G8428 CUR MEDS NOT DOCUMENT: HCPCS | Performed by: OTOLARYNGOLOGY

## 2025-07-30 PROCEDURE — 99213 OFFICE O/P EST LOW 20 MIN: CPT | Performed by: OTOLARYNGOLOGY

## 2025-07-30 PROCEDURE — 1123F ACP DISCUSS/DSCN MKR DOCD: CPT | Performed by: OTOLARYNGOLOGY

## 2025-07-30 NOTE — PROGRESS NOTES
[facility]  Dr. John Rivas D.O. Ms.Ed.  New Consult       Patient Name:  Ivon Ghotra  :  1935     CHIEF C/O:    Chief Complaint   Patient presents with    New Patient     NP Thyriod nodule/ CT cervical spine(in epic)           HISTORY OBTAINED FROM:  patient    HISTORY OF PRESENT ILLNESS:      Patient is a 89 year old female presenting to the office to discuss thyroid nodules found on imaging incidentally.     Imaging:   Bilateral thyroid nodules with coarse calcifications involving the right   lobe.     Patient reports that she has had these nodules since she was in her 30's and was followed by Dr. Palmer. Last U/S of her thyroid was in 2019 which showed no growth of the nodules. Patient denies any U/S since then. FNA was attempted in 2017 with insufficient sampling.     Patient denies any difficulty swallowing. No further concerns.         Past Medical History:   Diagnosis Date    Anxiety     Anxiety     Arthritis     Bronchitis, chronic (HCC)     CAD (coronary artery disease)     Dry eye syndrome     GERD (gastroesophageal reflux disease)     Hiatal hernia     Hyperlipidemia     Hypertension     Macular degeneration     lily    Reflux     Thyroid disease     goiter\" watching it- Dr. Persaud\"     Past Surgical History:   Procedure Laterality Date    CARDIAC SURGERY      heart cath 11/29/10 with stent placed    COLONOSCOPY  2016    DILATION AND CURETTAGE OF UTERUS      INTRACAPSULAR CATARACT EXTRACTION Right 2021    CATARACT EXTRACTION WITH IOL IMPLANT-RIGHT EYE performed by Grzegorz Zamora DO at [facility] OR    INTRACAPSULAR CATARACT EXTRACTION Left 2021    CATARACT EXTRACTION WITH IOL IMPLANT-LEFT EYE performed by Grzegorz Zamora DO at [facility] OR    VASCULAR SURGERY  2010    1 - stent:  Dr. hardin-        Current Outpatient Medications:     nystatin (MYCOSTATIN) 618272 UNIT/GM cream, Apply 1 each topically as needed for Dry Skin, Disp: ,

## 2025-08-25 LAB
ALBUMIN SERPL-MCNC: 3.9 G/DL (ref 3.5–5.2)
ALP SERPL-CCNC: 75 U/L (ref 35–104)
ALT SERPL-CCNC: 11 U/L (ref 0–35)
ANION GAP SERPL CALCULATED.3IONS-SCNC: 13 MMOL/L (ref 7–16)
AST SERPL-CCNC: 24 U/L (ref 0–35)
BASOPHILS # BLD: 0.02 K/UL (ref 0–0.2)
BASOPHILS NFR BLD: 0 % (ref 0–2)
BILIRUB SERPL-MCNC: 0.5 MG/DL (ref 0–1.2)
BUN SERPL-MCNC: 16 MG/DL (ref 8–23)
CALCIUM SERPL-MCNC: 9.2 MG/DL (ref 8.8–10.2)
CHLORIDE SERPL-SCNC: 107 MMOL/L (ref 98–107)
CO2 SERPL-SCNC: 20 MMOL/L (ref 22–29)
CREAT SERPL-MCNC: 0.7 MG/DL (ref 0.5–1)
EOSINOPHIL # BLD: 0.28 K/UL (ref 0.05–0.5)
EOSINOPHILS RELATIVE PERCENT: 4 % (ref 0–6)
ERYTHROCYTE [DISTWIDTH] IN BLOOD BY AUTOMATED COUNT: 13.3 % (ref 11.5–15)
GFR, ESTIMATED: 85 ML/MIN/1.73M2
GLUCOSE SERPL-MCNC: 127 MG/DL (ref 74–99)
HBA1C MFR BLD: 6.3 % (ref 4–5.6)
HCT VFR BLD AUTO: 39.6 % (ref 34–48)
HGB BLD-MCNC: 13 G/DL (ref 11.5–15.5)
IMM GRANULOCYTES # BLD AUTO: 0.03 K/UL (ref 0–0.58)
IMM GRANULOCYTES NFR BLD: 0 % (ref 0–5)
LYMPHOCYTES NFR BLD: 1.75 K/UL (ref 1.5–4)
LYMPHOCYTES RELATIVE PERCENT: 26 % (ref 20–42)
MCH RBC QN AUTO: 30 PG (ref 26–35)
MCHC RBC AUTO-ENTMCNC: 32.8 G/DL (ref 32–34.5)
MCV RBC AUTO: 91.2 FL (ref 80–99.9)
MONOCYTES NFR BLD: 0.6 K/UL (ref 0.1–0.95)
MONOCYTES NFR BLD: 9 % (ref 2–12)
NEUTROPHILS NFR BLD: 60 % (ref 43–80)
NEUTS SEG NFR BLD: 4.05 K/UL (ref 1.8–7.3)
PLATELET # BLD AUTO: 176 K/UL (ref 130–450)
PMV BLD AUTO: 11.4 FL (ref 7–12)
POTASSIUM SERPL-SCNC: 4.2 MMOL/L (ref 3.5–5.1)
PROT SERPL-MCNC: 6.4 G/DL (ref 6.4–8.3)
RBC # BLD AUTO: 4.34 M/UL (ref 3.5–5.5)
SODIUM SERPL-SCNC: 140 MMOL/L (ref 136–145)
WBC OTHER # BLD: 6.7 K/UL (ref 4.5–11.5)

## (undated) DEVICE — Device: Brand: MALYUGIN RING SYSTEM 7.0MM

## (undated) DEVICE — NEEDLE HYPO 18GA L1.5IN PNK POLYPR HUB S STL THN WALL FILL

## (undated) DEVICE — STERILE POLYISOPRENE POWDER-FREE SURGICAL GLOVES: Brand: PROTEXIS

## (undated) DEVICE — PEN: MARKING STD 100/CS: Brand: MEDICAL ACTION INDUSTRIES

## (undated) DEVICE — SOLUTION IV IRRIG 1000ML POUR BTL 2F7114

## (undated) DEVICE — SOLUTION IV IRRIG WATER 1000ML POUR BRL 2F7114

## (undated) DEVICE — SURGICAL PROCEDURE PACK CATRCT LT EYE BASIC CUST ST JOS LF

## (undated) DEVICE — EYE PAK* 1043 NON-WOVEN OPHTHALMIC DRAPE APERTURE POUCH: Brand: ALCON EYE-PAK

## (undated) DEVICE — 3 ML SYRINGE LUER-LOCK TIP: Brand: MONOJECT